# Patient Record
Sex: MALE | Race: WHITE | NOT HISPANIC OR LATINO | Employment: OTHER | URBAN - METROPOLITAN AREA
[De-identification: names, ages, dates, MRNs, and addresses within clinical notes are randomized per-mention and may not be internally consistent; named-entity substitution may affect disease eponyms.]

---

## 2017-01-06 ENCOUNTER — HOSPITAL ENCOUNTER (OUTPATIENT)
Dept: GENERAL RADIOLOGY | Facility: HOSPITAL | Age: 67
Discharge: HOME | End: 2017-01-06
Attending: FAMILY MEDICINE | Admitting: FAMILY MEDICINE

## 2021-04-07 ENCOUNTER — OFFICE (AMBULATORY)
Dept: URBAN - METROPOLITAN AREA PATHOLOGY 4 | Facility: PATHOLOGY | Age: 71
End: 2021-04-07
Payer: COMMERCIAL

## 2021-04-07 ENCOUNTER — ON CAMPUS - OUTPATIENT (AMBULATORY)
Dept: URBAN - METROPOLITAN AREA HOSPITAL 2 | Facility: HOSPITAL | Age: 71
End: 2021-04-07
Payer: COMMERCIAL

## 2021-04-07 VITALS
SYSTOLIC BLOOD PRESSURE: 114 MMHG | DIASTOLIC BLOOD PRESSURE: 35 MMHG | TEMPERATURE: 97.5 F | HEIGHT: 67 IN | HEART RATE: 72 BPM | OXYGEN SATURATION: 98 % | DIASTOLIC BLOOD PRESSURE: 64 MMHG | HEART RATE: 69 BPM | SYSTOLIC BLOOD PRESSURE: 157 MMHG | HEART RATE: 67 BPM | SYSTOLIC BLOOD PRESSURE: 149 MMHG | HEART RATE: 94 BPM | RESPIRATION RATE: 20 BRPM | SYSTOLIC BLOOD PRESSURE: 141 MMHG | DIASTOLIC BLOOD PRESSURE: 45 MMHG | HEART RATE: 70 BPM | RESPIRATION RATE: 16 BRPM | DIASTOLIC BLOOD PRESSURE: 72 MMHG | RESPIRATION RATE: 18 BRPM | DIASTOLIC BLOOD PRESSURE: 69 MMHG | OXYGEN SATURATION: 96 % | OXYGEN SATURATION: 95 % | DIASTOLIC BLOOD PRESSURE: 50 MMHG | WEIGHT: 227 LBS | SYSTOLIC BLOOD PRESSURE: 116 MMHG | SYSTOLIC BLOOD PRESSURE: 127 MMHG | SYSTOLIC BLOOD PRESSURE: 137 MMHG | OXYGEN SATURATION: 94 % | SYSTOLIC BLOOD PRESSURE: 103 MMHG | OXYGEN SATURATION: 99 % | DIASTOLIC BLOOD PRESSURE: 55 MMHG | OXYGEN SATURATION: 97 %

## 2021-04-07 DIAGNOSIS — D12.2 BENIGN NEOPLASM OF ASCENDING COLON: ICD-10-CM

## 2021-04-07 DIAGNOSIS — R19.5 OTHER FECAL ABNORMALITIES: ICD-10-CM

## 2021-04-07 DIAGNOSIS — K57.30 DIVERTICULOSIS OF LARGE INTESTINE WITHOUT PERFORATION OR ABS: ICD-10-CM

## 2021-04-07 DIAGNOSIS — D3A.8 OTHER BENIGN NEUROENDOCRINE TUMORS: ICD-10-CM

## 2021-04-07 DIAGNOSIS — K64.8 OTHER HEMORRHOIDS: ICD-10-CM

## 2021-04-07 PROBLEM — K63.5 POLYP OF COLON: Status: ACTIVE | Noted: 2021-04-07

## 2021-04-07 PROBLEM — K62.1 RECTAL POLYP: Status: ACTIVE | Noted: 2021-04-07

## 2021-04-07 LAB
GI HISTOLOGY: A. UNSPECIFIED: (no result)
GI HISTOLOGY: B. UNSPECIFIED: (no result)
GI HISTOLOGY: PDF REPORT: (no result)

## 2021-04-07 PROCEDURE — 45385 COLONOSCOPY W/LESION REMOVAL: CPT | Performed by: INTERNAL MEDICINE

## 2021-04-07 PROCEDURE — 88341 IMHCHEM/IMCYTCHM EA ADD ANTB: CPT | Mod: 26 | Performed by: INTERNAL MEDICINE

## 2021-04-07 PROCEDURE — 88342 IMHCHEM/IMCYTCHM 1ST ANTB: CPT | Mod: 26 | Performed by: INTERNAL MEDICINE

## 2021-04-07 PROCEDURE — 88305 TISSUE EXAM BY PATHOLOGIST: CPT | Mod: 26 | Performed by: INTERNAL MEDICINE

## 2022-07-08 ENCOUNTER — OFFICE VISIT (OUTPATIENT)
Dept: CARDIOLOGY | Facility: CLINIC | Age: 72
End: 2022-07-08

## 2022-07-08 ENCOUNTER — PATIENT ROUNDING (BHMG ONLY) (OUTPATIENT)
Dept: CARDIOLOGY | Facility: CLINIC | Age: 72
End: 2022-07-08

## 2022-07-08 VITALS
DIASTOLIC BLOOD PRESSURE: 57 MMHG | HEART RATE: 54 BPM | OXYGEN SATURATION: 99 % | HEIGHT: 66 IN | WEIGHT: 237 LBS | SYSTOLIC BLOOD PRESSURE: 127 MMHG | BODY MASS INDEX: 38.09 KG/M2

## 2022-07-08 DIAGNOSIS — R06.09 DYSPNEA ON EXERTION: ICD-10-CM

## 2022-07-08 DIAGNOSIS — I20.9 NEW-ONSET ANGINA: Primary | ICD-10-CM

## 2022-07-08 DIAGNOSIS — E66.9 OBESITY (BMI 30-39.9): ICD-10-CM

## 2022-07-08 DIAGNOSIS — I10 ESSENTIAL HYPERTENSION: ICD-10-CM

## 2022-07-08 DIAGNOSIS — R00.1 BRADYCARDIA: ICD-10-CM

## 2022-07-08 DIAGNOSIS — E78.5 DYSLIPIDEMIA: ICD-10-CM

## 2022-07-08 DIAGNOSIS — R42 DIZZINESS: ICD-10-CM

## 2022-07-08 PROCEDURE — 99204 OFFICE O/P NEW MOD 45 MIN: CPT | Performed by: INTERNAL MEDICINE

## 2022-07-08 PROCEDURE — 93000 ELECTROCARDIOGRAM COMPLETE: CPT | Performed by: INTERNAL MEDICINE

## 2022-07-08 RX ORDER — ASPIRIN 81 MG/1
81 TABLET ORAL DAILY
Qty: 90 TABLET | Refills: 3 | Status: SHIPPED | OUTPATIENT
Start: 2022-07-08

## 2022-07-08 RX ORDER — BACLOFEN 20 MG/1
TABLET ORAL
COMMUNITY
Start: 2022-05-25

## 2022-07-08 RX ORDER — DULOXETIN HYDROCHLORIDE 60 MG/1
CAPSULE, DELAYED RELEASE ORAL
COMMUNITY
Start: 2022-06-21

## 2022-07-08 RX ORDER — AMMONIUM LACTATE 12 G/100G
LOTION TOPICAL
COMMUNITY
Start: 2022-04-11

## 2022-07-08 RX ORDER — PRAVASTATIN SODIUM 40 MG
TABLET ORAL
COMMUNITY

## 2022-07-08 RX ORDER — ALLOPURINOL 100 MG/1
100 TABLET ORAL
COMMUNITY

## 2022-07-08 RX ORDER — MELATONIN
1000 DAILY
COMMUNITY

## 2022-07-08 RX ORDER — GABAPENTIN 800 MG/1
TABLET ORAL
COMMUNITY
Start: 2022-07-05

## 2022-07-08 RX ORDER — HYDROCODONE BITARTRATE AND ACETAMINOPHEN 10; 325 MG/1; MG/1
TABLET ORAL
COMMUNITY
Start: 2022-07-07

## 2022-07-08 RX ORDER — ALBUTEROL SULFATE 90 UG/1
AEROSOL, METERED RESPIRATORY (INHALATION)
COMMUNITY
Start: 2022-05-03

## 2022-07-08 RX ORDER — MELOXICAM 15 MG/1
TABLET ORAL
COMMUNITY
Start: 2022-04-30

## 2022-07-08 RX ORDER — OMEPRAZOLE 20 MG/1
CAPSULE, DELAYED RELEASE ORAL
COMMUNITY
Start: 2022-05-03

## 2022-07-08 RX ORDER — LOSARTAN POTASSIUM 50 MG/1
50 TABLET ORAL DAILY
COMMUNITY

## 2022-07-08 RX ORDER — NITROGLYCERIN 0.4 MG/1
TABLET SUBLINGUAL
COMMUNITY
Start: 2022-05-03

## 2022-07-08 RX ORDER — CITALOPRAM 40 MG/1
TABLET ORAL
COMMUNITY
Start: 2022-05-06

## 2022-07-08 RX ORDER — ISOSORBIDE MONONITRATE 30 MG/1
30 TABLET, EXTENDED RELEASE ORAL EVERY MORNING
Qty: 30 TABLET | Refills: 11 | Status: SHIPPED | OUTPATIENT
Start: 2022-07-08

## 2022-07-08 RX ORDER — MECLIZINE HYDROCHLORIDE 25 MG/1
TABLET ORAL
COMMUNITY
Start: 2022-07-05

## 2022-07-08 RX ORDER — TAMSULOSIN HYDROCHLORIDE 0.4 MG/1
CAPSULE ORAL
COMMUNITY
Start: 2022-06-21

## 2022-07-08 NOTE — PROGRESS NOTES
Cardiology Consult Note    Patient Identification:  Name: Poncho Dalton  Age: 71 y.o.  Sex: male  :  1950  MRN: [id number]             Requesting Physician :  Ronaldo Alvarez MD      Reason for Consultation / Chief Complaint : Chest pain, bradycardia    History of Present Illness:      Mr. Poncho Mckenzie has PMH of    Hypertension  Hyperlipidemia  ELVIS/CPAP  BPH  Gout  Carcinoid tumor  THR, hernia surgery  Family history of CAD in brother  Smoker      Here for evaluation of chest pain.  Patient has been having recurrent substernal chest pain relieved with nitroglycerin in last 6 months.  Has been having palpitations shortness of breath and dizziness.    Patient's arterial blood pressure is 127/57, heart rate 54, O2 sat of 99% on room air.      Assessment:  :    Chest pain relieved with nitroglycerin consistent with new onset angina  Palpitations  Shortness of breath/dyspnea on exertion  Bradycardia  Dizziness  Hypertension  Dyslipidemia  ELVIS/CPAP, COPD  Gout, BPH  Obesity with BMI over 30  Smoker      Recommendations / Plan:        Reviewed EKG results with patient.  Continue medical management with losartan, pravastatin.  Add aspirin, isosorbide mononitrate  We will hold off on beta-blockers due to bradycardia.  Patient is on no medications which can make him bradycardic, will monitor and consider further evaluation treatment about bradycardia and follow-up.  We will check an echo to evaluate patient's dyspnea  Patient has stasis changes and has dyspnea we will check BNP level.  We will check stress Cardiolite to evaluate new onset angina  Counseled on smoking cessation  Reviewed BMI over 30 counseled on weight loss diet and exercise  We will follow-up and consider further evaluation treatment.             Diagnosis Plan   1. New-onset angina (HCC)  Adult Transthoracic Echo Complete W/ Cont if Necessary Per Protocol    Stress Test With Myocardial Perfusion One Day    BNP    Basic Metabolic  Panel   2. Dyspnea on exertion  Adult Transthoracic Echo Complete W/ Cont if Necessary Per Protocol    Stress Test With Myocardial Perfusion One Day    BNP    Basic Metabolic Panel   3. Dizziness  Adult Transthoracic Echo Complete W/ Cont if Necessary Per Protocol    Stress Test With Myocardial Perfusion One Day    BNP    Basic Metabolic Panel   4. Bradycardia  Adult Transthoracic Echo Complete W/ Cont if Necessary Per Protocol    Stress Test With Myocardial Perfusion One Day    BNP    Basic Metabolic Panel   5. Essential hypertension  Adult Transthoracic Echo Complete W/ Cont if Necessary Per Protocol    Stress Test With Myocardial Perfusion One Day    BNP    Basic Metabolic Panel   6. Dyslipidemia  Adult Transthoracic Echo Complete W/ Cont if Necessary Per Protocol    Stress Test With Myocardial Perfusion One Day    BNP    Basic Metabolic Panel   7. Obesity (BMI 30-39.9)  Adult Transthoracic Echo Complete W/ Cont if Necessary Per Protocol    Stress Test With Myocardial Perfusion One Day    BNP    Basic Metabolic Panel              Past Medical History:  History reviewed. No pertinent past medical history.  Past Surgical History:  Past Surgical History:   Procedure Laterality Date   • CARDIAC CATHETERIZATION        Allergies:  No Known Allergies  Home Meds:  Current Meds:     Current Outpatient Medications:   •  albuterol sulfate  (90 Base) MCG/ACT inhaler, , Disp: , Rfl:   •  allopurinol (ZYLOPRIM) 100 MG tablet, Take 100 mg by mouth., Disp: , Rfl:   •  ammonium lactate (LAC-HYDRIN) 12 % lotion, , Disp: , Rfl:   •  baclofen (LIORESAL) 20 MG tablet, , Disp: , Rfl:   •  Calcium Carbonate-Vitamin D (Oyster Shell Calcium/D) 250-125 MG-UNIT tablet, Take 1 tablet by mouth Daily., Disp: , Rfl:   •  cholecalciferol (VITAMIN D3) 25 MCG (1000 UT) tablet, Take 1,000 Units by mouth Daily., Disp: , Rfl:   •  citalopram (CeleXA) 40 MG tablet, , Disp: , Rfl:   •  DULoxetine (CYMBALTA) 60 MG capsule, , Disp: , Rfl:   •   "gabapentin (NEURONTIN) 800 MG tablet, , Disp: , Rfl:   •  HYDROcodone-acetaminophen (NORCO)  MG per tablet, , Disp: , Rfl:   •  losartan (COZAAR) 50 MG tablet, Take 50 mg by mouth Daily., Disp: , Rfl:   •  meclizine (ANTIVERT) 25 MG tablet, , Disp: , Rfl:   •  meloxicam (MOBIC) 15 MG tablet, , Disp: , Rfl:   •  nitroglycerin (NITROSTAT) 0.4 MG SL tablet, , Disp: , Rfl:   •  omeprazole (priLOSEC) 20 MG capsule, , Disp: , Rfl:   •  pravastatin (PRAVACHOL) 40 MG tablet, Take  by mouth., Disp: , Rfl:   •  tamsulosin (FLOMAX) 0.4 MG capsule 24 hr capsule, , Disp: , Rfl:   •  aspirin (aspirin) 81 MG EC tablet, Take 1 tablet by mouth Daily., Disp: 90 tablet, Rfl: 3  •  isosorbide mononitrate (IMDUR) 30 MG 24 hr tablet, Take 1 tablet by mouth Every Morning., Disp: 30 tablet, Rfl: 11  Social History:   Social History     Tobacco Use   • Smoking status: Current Every Day Smoker     Types: Cigarettes   • Smokeless tobacco: Never Used   Substance Use Topics   • Alcohol use: Not on file      Family History:  No family history on file.     Review of Systems : Review of Systems   Constitutional: Negative for fever and malaise/fatigue.   Cardiovascular: Positive for chest pain and palpitations. Negative for dyspnea on exertion.   Respiratory: Positive for shortness of breath. Negative for cough.    Skin: Negative for rash.   Gastrointestinal: Negative for abdominal pain, nausea and vomiting.   Neurological: Positive for dizziness and numbness. Negative for focal weakness and headaches.   All other systems reviewed and are negative.            Constitutional:       Physical Exam   /57 (BP Location: Left arm, Patient Position: Sitting, Cuff Size: Large Adult)   Pulse 54   Ht 167.6 cm (66\")   Wt 108 kg (237 lb)   SpO2 99%   BMI 38.25 kg/m²   Physical Exam  General:  Appears in no acute distress  Eyes: Sclera is anicteric,  conjunctiva is clear   HEENT:  No JVD. Thyroid not visibly enlarged. No mucosal pallor or " cyanosis  Respiratory: Respirations regular and unlabored at rest.  Bilaterally good breath sounds, with good air entry in all fields. No crackles, rubs or wheezes auscultated  Cardiovascular: S1,S2 Regular rate and rhythm. No murmur, rub or gallop auscultated.  Gastrointestinal: Abdomen soft, flat, non tender. Bowel sounds present.   Musculoskeletal:  No abnormal movements  Extremities: No digital clubbing or cyanosis  Skin: Stasis dermatitis in  Neuro: Alert and awake, walks with a walker    Cardiographics  ECG: EKG tracing was  personally reviewed/interpreted by me    ECG 12 Lead    Date/Time: 7/8/2022 8:19 PM  Performed by: Adan Lutz MD  Authorized by: Adan Lutz MD   Comparison: not compared with previous ECG   Previous ECG: no previous ECG available  Comments: EKG done today reviewed/interpreted by me reveals sinus bradycardia at the rate of 54 bpm            Echocardiogram:       Imaging  Chest X-ray:   Imaging Results (Last 24 Hours)     ** No results found for the last 24 hours. **          Lab Review: I have reviewed the labs                                      Adan Lutz MD  7/13/2022, 20:14 EDT      EMR Dragon/Transcription:   Dictated utilizing Dragon dictation

## 2022-07-08 NOTE — PROGRESS NOTES
A My-Chart message has been sent to the patient for PATIENT ROUNDING with [facility]

## 2022-07-29 ENCOUNTER — OFFICE VISIT (OUTPATIENT)
Dept: CARDIOLOGY | Facility: CLINIC | Age: 72
End: 2022-07-29

## 2022-07-29 ENCOUNTER — HOSPITAL ENCOUNTER (OUTPATIENT)
Dept: CARDIOLOGY | Facility: HOSPITAL | Age: 72
Discharge: HOME | End: 2022-07-29

## 2022-07-29 VITALS
SYSTOLIC BLOOD PRESSURE: 124 MMHG | WEIGHT: 237 LBS | HEIGHT: 66 IN | DIASTOLIC BLOOD PRESSURE: 80 MMHG | BODY MASS INDEX: 38.09 KG/M2 | HEART RATE: 61 BPM

## 2022-07-29 VITALS
WEIGHT: 237 LBS | SYSTOLIC BLOOD PRESSURE: 133 MMHG | HEIGHT: 66 IN | DIASTOLIC BLOOD PRESSURE: 87 MMHG | BODY MASS INDEX: 38.09 KG/M2

## 2022-07-29 DIAGNOSIS — E66.9 OBESITY (BMI 30-39.9): ICD-10-CM

## 2022-07-29 DIAGNOSIS — I10 ESSENTIAL HYPERTENSION: ICD-10-CM

## 2022-07-29 DIAGNOSIS — I20.9 NEW-ONSET ANGINA: ICD-10-CM

## 2022-07-29 DIAGNOSIS — R00.1 BRADYCARDIA: ICD-10-CM

## 2022-07-29 DIAGNOSIS — R00.1 BRADYCARDIA: Primary | ICD-10-CM

## 2022-07-29 DIAGNOSIS — E78.5 DYSLIPIDEMIA: ICD-10-CM

## 2022-07-29 DIAGNOSIS — R42 DIZZINESS: ICD-10-CM

## 2022-07-29 DIAGNOSIS — R06.09 DYSPNEA ON EXERTION: ICD-10-CM

## 2022-07-29 LAB
BH CV ECHO MEAS - AO MAX PG: 6.4 MMHG
BH CV ECHO MEAS - AO MEAN PG: 3 MMHG
BH CV ECHO MEAS - AO ROOT DIAM: 3.3 CM
BH CV ECHO MEAS - AO V2 MAX: 126.4 CM/SEC
BH CV ECHO MEAS - AO V2 VTI: 25.6 CM
BH CV ECHO MEAS - AVA(I,D): 2.8 CM2
BH CV ECHO MEAS - EDV(CUBED): 164.4 ML
BH CV ECHO MEAS - EDV(MOD-SP4): 73.9 ML
BH CV ECHO MEAS - EF(MOD-SP4): 56.6 %
BH CV ECHO MEAS - ESV(CUBED): 27 ML
BH CV ECHO MEAS - ESV(MOD-SP4): 32 ML
BH CV ECHO MEAS - FS: 45.2 %
BH CV ECHO MEAS - IVS/LVPW: 0.89 CM
BH CV ECHO MEAS - IVSD: 1.04 CM
BH CV ECHO MEAS - LA DIMENSION: 4.4 CM
BH CV ECHO MEAS - LV MASS(C)D: 242.4 GRAMS
BH CV ECHO MEAS - LV MAX PG: 2.6 MMHG
BH CV ECHO MEAS - LV MEAN PG: 1.21 MMHG
BH CV ECHO MEAS - LV V1 MAX: 81.4 CM/SEC
BH CV ECHO MEAS - LV V1 VTI: 19 CM
BH CV ECHO MEAS - LVIDD: 5.5 CM
BH CV ECHO MEAS - LVIDS: 3 CM
BH CV ECHO MEAS - LVOT AREA: 3.8 CM2
BH CV ECHO MEAS - LVOT DIAM: 2.21 CM
BH CV ECHO MEAS - LVPWD: 1.17 CM
BH CV ECHO MEAS - MV A MAX VEL: 90.7 CM/SEC
BH CV ECHO MEAS - MV DEC SLOPE: 175.4 CM/SEC2
BH CV ECHO MEAS - MV DEC TIME: 0.31 MSEC
BH CV ECHO MEAS - MV E MAX VEL: 54.5 CM/SEC
BH CV ECHO MEAS - MV E/A: 0.6
BH CV ECHO MEAS - MV MAX PG: 4.1 MMHG
BH CV ECHO MEAS - MV MEAN PG: 1.37 MMHG
BH CV ECHO MEAS - MV V2 VTI: 27.9 CM
BH CV ECHO MEAS - MVA(VTI): 2.6 CM2
BH CV ECHO MEAS - PA ACC TIME: 0.1 SEC
BH CV ECHO MEAS - PA PR(ACCEL): 33.8 MMHG
BH CV ECHO MEAS - PA V2 MAX: 112 CM/SEC
BH CV ECHO MEAS - RV MAX PG: 2.9 MMHG
BH CV ECHO MEAS - RV V1 MAX: 85.6 CM/SEC
BH CV ECHO MEAS - RV V1 VTI: 11.4 CM
BH CV ECHO MEAS - RVDD: 3.2 CM
BH CV ECHO MEAS - SV(LVOT): 72.7 ML
BH CV ECHO MEAS - SV(MOD-SP4): 41.9 ML
BH CV REST NUCLEAR ISOTOPE DOSE: 11 MCI
BH CV STRESS BP STAGE 1: NORMAL
BH CV STRESS COMMENTS STAGE 1: NORMAL
BH CV STRESS DOSE REGADENOSON STAGE 1: 0.4
BH CV STRESS DURATION MIN STAGE 1: 0
BH CV STRESS DURATION SEC STAGE 1: 10
BH CV STRESS HR STAGE 1: 60
BH CV STRESS NUCLEAR ISOTOPE DOSE: 31.2 MCI
BH CV STRESS PROTOCOL 1: NORMAL
BH CV STRESS RECOVERY BP: NORMAL MMHG
BH CV STRESS RECOVERY HR: 77 BPM
BH CV STRESS STAGE 1: 1
MAXIMAL PREDICTED HEART RATE: 150 BPM
MAXIMAL PREDICTED HEART RATE: 150 BPM
STRESS BASELINE BP: NORMAL MMHG
STRESS BASELINE HR: 60 BPM
STRESS TARGET HR: 128 BPM
STRESS TARGET HR: 128 BPM

## 2022-07-29 PROCEDURE — 93306 TTE W/DOPPLER COMPLETE: CPT | Performed by: INTERNAL MEDICINE

## 2022-07-29 PROCEDURE — 93018 CV STRESS TEST I&R ONLY: CPT | Performed by: INTERNAL MEDICINE

## 2022-07-29 PROCEDURE — 93306 TTE W/DOPPLER COMPLETE: CPT

## 2022-07-29 PROCEDURE — 78452 HT MUSCLE IMAGE SPECT MULT: CPT | Performed by: INTERNAL MEDICINE

## 2022-07-29 PROCEDURE — 93016 CV STRESS TEST SUPVJ ONLY: CPT | Performed by: NURSE PRACTITIONER

## 2022-07-29 PROCEDURE — 78452 HT MUSCLE IMAGE SPECT MULT: CPT

## 2022-07-29 PROCEDURE — 93017 CV STRESS TEST TRACING ONLY: CPT

## 2022-07-29 PROCEDURE — A9500 TC99M SESTAMIBI: HCPCS | Performed by: INTERNAL MEDICINE

## 2022-07-29 PROCEDURE — 25010000002 REGADENOSON 0.4 MG/5ML SOLUTION: Performed by: INTERNAL MEDICINE

## 2022-07-29 PROCEDURE — 99214 OFFICE O/P EST MOD 30 MIN: CPT | Performed by: INTERNAL MEDICINE

## 2022-07-29 PROCEDURE — 0 TECHNETIUM SESTAMIBI: Performed by: INTERNAL MEDICINE

## 2022-07-29 RX ADMIN — TECHNETIUM TC 99M SESTAMIBI 1 DOSE: 1 INJECTION INTRAVENOUS at 08:23

## 2022-07-29 RX ADMIN — REGADENOSON 0.4 MG: 0.08 INJECTION, SOLUTION INTRAVENOUS at 10:52

## 2022-07-29 RX ADMIN — TECHNETIUM TC 99M SESTAMIBI 1 DOSE: 1 INJECTION INTRAVENOUS at 10:51

## 2022-07-29 NOTE — PROGRESS NOTES
Subjective:     Encounter Date:07/29/2022      Patient ID: Poncho Dalton is a 71 y.o. male.    Chief Complaint : Stress test follow-up.    History of Present Illness        Mr. Poncho Mckenzie has PMH of    Hypertension  Hyperlipidemia  ELVIS/CPAP  BPH  Gout  Carcinoid tumor  THR, hernia surgery  Family history of CAD in brother  Smoker      Here for stress test follow-up.  Patient was seen for evaluation of chest pain.  Patient has been having recurrent substernal chest pain relieved with nitroglycerin in last 6 months.  Has been having palpitations shortness of breath and dizziness.    Patient's arterial blood pressure is 124/80, heart rate 61 bpm.      Assessment:  :    Chest pain relieved with nitroglycerin consistent with new onset angina  Palpitations  Shortness of breath/dyspnea on exertion  Bradycardia  Dizziness  Hypertension  Dyslipidemia  ELVIS/CPAP, COPD  Gout, BPH  Obesity with BMI over 30  Smoker      Recommendations / Plan:        Reviewed echo and stress test results with patient.  Continue medical management with losartan, pravastatin.  Add aspirin, isosorbide mononitrate  We will hold off on beta-blockers due to bradycardia.  Patient is on no medications which can make him bradycardic, will monitor and consider further evaluation treatment about bradycardia and follow-up.  Counseled on smoking cessation  Reviewed BMI over 30, counseled on weight loss diet and exercise  We will follow-up and consider further evaluation treatment.  Patient has stasis changes bilateral lower extremity we will check BNP which is not done.  Will check BNP level.        Procedures  Echocardiogram 720 8/9/2022 reviewed/interpreted by me reveals normal LV systolic function  Lexiscan Cardiolite 7/29/2022 reviewed/interpreted by me reveals normal perfusion EF of 67%    The following portions of the patient's history were reviewed and updated as appropriate: allergies, current medications, past family history, past medical  history, past social history, past surgical history and problem list.    Assessment:         [facility]     Diagnosis Plan   1. Bradycardia     2. Dyslipidemia     3. Essential hypertension     4. Obesity (BMI 30-39.9)            Plan:               Past Medical History:  History reviewed. No pertinent past medical history.  Past Surgical History:  Past Surgical History:   Procedure Laterality Date   • CARDIAC CATHETERIZATION        Allergies:  No Known Allergies  Home Meds:  Current Meds:     Current Outpatient Medications:   •  albuterol sulfate  (90 Base) MCG/ACT inhaler, , Disp: , Rfl:   •  allopurinol (ZYLOPRIM) 100 MG tablet, Take 100 mg by mouth., Disp: , Rfl:   •  ammonium lactate (LAC-HYDRIN) 12 % lotion, , Disp: , Rfl:   •  aspirin (aspirin) 81 MG EC tablet, Take 1 tablet by mouth Daily., Disp: 90 tablet, Rfl: 3  •  baclofen (LIORESAL) 20 MG tablet, , Disp: , Rfl:   •  Calcium Carbonate-Vitamin D (Oyster Shell Calcium/D) 250-125 MG-UNIT tablet, Take 1 tablet by mouth Daily., Disp: , Rfl:   •  cholecalciferol (VITAMIN D3) 25 MCG (1000 UT) tablet, Take 1,000 Units by mouth Daily., Disp: , Rfl:   •  citalopram (CeleXA) 40 MG tablet, , Disp: , Rfl:   •  DULoxetine (CYMBALTA) 60 MG capsule, , Disp: , Rfl:   •  gabapentin (NEURONTIN) 800 MG tablet, , Disp: , Rfl:   •  HYDROcodone-acetaminophen (NORCO)  MG per tablet, , Disp: , Rfl:   •  isosorbide mononitrate (IMDUR) 30 MG 24 hr tablet, Take 1 tablet by mouth Every Morning., Disp: 30 tablet, Rfl: 11  •  losartan (COZAAR) 50 MG tablet, Take 50 mg by mouth Daily., Disp: , Rfl:   •  meclizine (ANTIVERT) 25 MG tablet, , Disp: , Rfl:   •  meloxicam (MOBIC) 15 MG tablet, , Disp: , Rfl:   •  nitroglycerin (NITROSTAT) 0.4 MG SL tablet, , Disp: , Rfl:   •  omeprazole (priLOSEC) 20 MG capsule, , Disp: , Rfl:   •  pravastatin (PRAVACHOL) 40 MG tablet, Take  by mouth., Disp: , Rfl:   •  tamsulosin (FLOMAX) 0.4 MG capsule 24 hr capsule, , Disp: , Rfl:   No current  "facility-administered medications for this visit.  Social History:   Social History     Tobacco Use   • Smoking status: Current Every Day Smoker     Types: Cigarettes   • Smokeless tobacco: Never Used   Substance Use Topics   • Alcohol use: Defer      Family History:  History reviewed. No pertinent family history.           Review of Systems   Constitutional: Negative for chills, fever, malaise/fatigue and weight gain.   HENT: Negative for nosebleeds.    Eyes: Negative for visual disturbance.   Cardiovascular: Negative for chest pain, dyspnea on exertion, leg swelling, near-syncope, palpitations and syncope.   Respiratory: Negative for shortness of breath.    Endocrine: Negative for cold intolerance.   Hematologic/Lymphatic: Negative for bleeding problem.   Skin: Negative for rash.   Musculoskeletal: Negative for back pain.   Gastrointestinal: Negative for nausea and vomiting.   Genitourinary: Negative for nocturia.   Neurological: Negative for dizziness, light-headedness, numbness and weakness.   Psychiatric/Behavioral: Negative for altered mental status.   Allergic/Immunologic: Negative for hives.   All other systems reviewed and are negative.    All other systems are negative         Objective:     Physical Exam  /80   Pulse 61   Ht 167.6 cm (66\")   Wt 108 kg (237 lb)   BMI 38.25 kg/m²   General:  Appears in no acute distress  Eyes: Sclera is anicteric,  conjunctiva is clear   HEENT:  No JVD.  No carotid bruits  Respiratory: Respirations regular and unlabored at rest.  Clear to auscultation  Cardiovascular: S1,S2 Regular rate and rhythm. No murmur, rub or gallop auscultated.   Extremities: No digital clubbing or cyanosis, no edema  Skin: Stasis changes  Neuro: Alert and awake.  Wheelchair-bound    Lab Reviewed:         Adan Lutz MD  7/29/2022 12:52 EDT      EMR Dragon/Transcription:   \"Dictated utilizing Dragon dictation\".        "

## 2023-05-24 ENCOUNTER — OFFICE VISIT (OUTPATIENT)
Dept: WOUND CARE | Facility: HOSPITAL | Age: 73
End: 2023-05-24
Payer: MEDICARE

## 2023-05-24 PROCEDURE — G0463 HOSPITAL OUTPT CLINIC VISIT: HCPCS

## 2023-05-26 ENCOUNTER — TRANSCRIBE ORDERS (OUTPATIENT)
Dept: ADMINISTRATIVE | Facility: HOSPITAL | Age: 73
End: 2023-05-26

## 2023-05-26 DIAGNOSIS — I70.213 ATHEROSCLEROSIS OF NATIVE ARTERY OF BOTH LOWER EXTREMITIES WITH INTERMITTENT CLAUDICATION: Primary | ICD-10-CM

## 2023-05-31 ENCOUNTER — OFFICE VISIT (OUTPATIENT)
Dept: WOUND CARE | Facility: HOSPITAL | Age: 73
End: 2023-05-31

## 2023-05-31 PROCEDURE — G0463 HOSPITAL OUTPT CLINIC VISIT: HCPCS

## 2023-06-01 ENCOUNTER — HOSPITAL ENCOUNTER (OUTPATIENT)
Dept: CARDIOLOGY | Facility: HOSPITAL | Age: 73
Discharge: HOME | End: 2023-06-01

## 2023-06-01 ENCOUNTER — TRANSCRIBE ORDERS (OUTPATIENT)
Dept: ADMINISTRATIVE | Facility: HOSPITAL | Age: 73
End: 2023-06-01
Payer: MEDICARE

## 2023-06-01 ENCOUNTER — HOSPITAL ENCOUNTER (OUTPATIENT)
Dept: GENERAL RADIOLOGY | Facility: HOSPITAL | Age: 73
Discharge: HOME | End: 2023-06-01

## 2023-06-01 DIAGNOSIS — M79.671 RIGHT FOOT PAIN: Primary | ICD-10-CM

## 2023-06-01 DIAGNOSIS — M79.671 RIGHT FOOT PAIN: ICD-10-CM

## 2023-06-01 DIAGNOSIS — I70.213 ATHEROSCLEROSIS OF NATIVE ARTERY OF BOTH LOWER EXTREMITIES WITH INTERMITTENT CLAUDICATION: ICD-10-CM

## 2023-06-01 LAB
BH CV LOWER ARTERIAL LEFT ABI RATIO: 0.81
BH CV LOWER ARTERIAL LEFT CALF RATIO: 0.92
BH CV LOWER ARTERIAL LEFT DORSALIS PEDIS SYS MAX: 84
BH CV LOWER ARTERIAL LEFT GREAT TOE SYS MAX: 125
BH CV LOWER ARTERIAL LEFT LOW THIGH RATIO: 1.03
BH CV LOWER ARTERIAL LEFT LOW THIGH SYS MAX: 107
BH CV LOWER ARTERIAL LEFT POPLITEAL SYS MAX: 96
BH CV LOWER ARTERIAL LEFT TBI RATIO: 1.2
BH CV LOWER ARTERIAL RIGHT ABI RATIO: 0.85
BH CV LOWER ARTERIAL RIGHT CALF RATIO: 0.83
BH CV LOWER ARTERIAL RIGHT DORSALIS PEDIS SYS MAX: 80
BH CV LOWER ARTERIAL RIGHT GREAT TOE SYS MAX: 119
BH CV LOWER ARTERIAL RIGHT LOW THIGH RATIO: 1.01
BH CV LOWER ARTERIAL RIGHT LOW THIGH SYS MAX: 105
BH CV LOWER ARTERIAL RIGHT POPLITEAL SYS MAX: 86
BH CV LOWER ARTERIAL RIGHT POST TIBIAL SYS MAX: 88
BH CV LOWER ARTERIAL RIGHT TBI RATIO: 1.14
MAXIMAL PREDICTED HEART RATE: 149 BPM
STRESS TARGET HR: 127 BPM
UPPER ARTERIAL LEFT ARM BRACHIAL SYS MAX: 93 MMHG
UPPER ARTERIAL RIGHT ARM BRACHIAL SYS MAX: 104 MMHG

## 2023-06-01 PROCEDURE — 73630 X-RAY EXAM OF FOOT: CPT

## 2023-06-01 PROCEDURE — 93923 UPR/LXTR ART STDY 3+ LVLS: CPT

## 2023-06-15 ENCOUNTER — APPOINTMENT (OUTPATIENT)
Dept: VASCULAR SURGERY | Facility: HOSPITAL | Age: 73
End: 2023-06-15
Payer: MEDICARE

## 2023-06-15 ENCOUNTER — TRANSCRIBE ORDERS (OUTPATIENT)
Dept: ADMINISTRATIVE | Facility: HOSPITAL | Age: 73
End: 2023-06-15
Payer: MEDICARE

## 2023-06-15 DIAGNOSIS — I73.9 PAD (PERIPHERAL ARTERY DISEASE): Primary | ICD-10-CM

## 2023-06-15 PROCEDURE — G0463 HOSPITAL OUTPT CLINIC VISIT: HCPCS

## 2023-08-02 ENCOUNTER — OFFICE VISIT (OUTPATIENT)
Dept: WOUND CARE | Facility: HOSPITAL | Age: 73
End: 2023-08-02
Payer: MEDICARE

## 2023-08-02 PROCEDURE — G0463 HOSPITAL OUTPT CLINIC VISIT: HCPCS

## 2023-08-08 RX ORDER — ISOSORBIDE MONONITRATE 30 MG/1
TABLET, EXTENDED RELEASE ORAL
Qty: 30 TABLET | Refills: 0 | Status: SHIPPED | OUTPATIENT
Start: 2023-08-08

## 2023-08-08 NOTE — TELEPHONE ENCOUNTER
Rx Refill Note  Requested Prescriptions     Pending Prescriptions Disp Refills    isosorbide mononitrate (IMDUR) 30 MG 24 hr tablet [Pharmacy Med Name: ISOSORBIDE MONONIT ER 30 MG TB] 30 tablet 0     Sig: TAKE ONE TABLET BY MOUTH EVERY MORNING      Last office visit with prescribing clinician: 7/29/2022   Last telemedicine visit with prescribing clinician: Visit date not found   Next office visit with prescribing clinician: Visit date not found                         Would you like a call back once the refill request has been completed: [] Yes [] No    If the office needs to give you a call back, can they leave a voicemail: [] Yes [] No    Wendy Thomas MA  08/08/23, 08:27 EDT

## 2023-09-05 RX ORDER — ISOSORBIDE MONONITRATE 30 MG/1
TABLET, EXTENDED RELEASE ORAL
Qty: 30 TABLET | Refills: 0 | OUTPATIENT
Start: 2023-09-05

## 2023-09-06 ENCOUNTER — OFFICE VISIT (OUTPATIENT)
Dept: WOUND CARE | Facility: HOSPITAL | Age: 73
End: 2023-09-06
Payer: MEDICARE

## 2023-09-06 DIAGNOSIS — L97.519: ICD-10-CM

## 2023-09-06 DIAGNOSIS — F17.200 NICOTINE DEPENDENCE, UNCOMPLICATED, UNSPECIFIED NICOTINE PRODUCT TYPE: ICD-10-CM

## 2023-09-06 DIAGNOSIS — L85.3 XEROSIS CUTIS: Primary | ICD-10-CM

## 2023-09-06 PROCEDURE — 97602 WOUND(S) CARE NON-SELECTIVE: CPT

## 2023-10-17 ENCOUNTER — TRANSCRIBE ORDERS (OUTPATIENT)
Dept: ADMINISTRATIVE | Facility: HOSPITAL | Age: 73
End: 2023-10-17
Payer: MEDICARE

## 2023-10-17 DIAGNOSIS — I71.43 INFRARENAL ABDOMINAL AORTIC ANEURYSM (AAA) WITHOUT RUPTURE: Primary | ICD-10-CM

## 2023-10-19 ENCOUNTER — APPOINTMENT (OUTPATIENT)
Dept: CARDIOLOGY | Facility: HOSPITAL | Age: 73
End: 2023-10-19
Payer: MEDICARE

## 2023-10-19 ENCOUNTER — HOSPITAL ENCOUNTER (EMERGENCY)
Facility: HOSPITAL | Age: 73
Discharge: HOME | End: 2023-10-19
Attending: EMERGENCY MEDICINE
Payer: MEDICARE

## 2023-10-19 VITALS
DIASTOLIC BLOOD PRESSURE: 79 MMHG | RESPIRATION RATE: 18 BRPM | HEIGHT: 66 IN | TEMPERATURE: 98.6 F | OXYGEN SATURATION: 93 % | WEIGHT: 238 LBS | BODY MASS INDEX: 38.25 KG/M2 | HEART RATE: 80 BPM | SYSTOLIC BLOOD PRESSURE: 136 MMHG

## 2023-10-19 DIAGNOSIS — L03.116 CELLULITIS OF LEFT LEG: Primary | ICD-10-CM

## 2023-10-19 LAB
BH CV LOWER VASCULAR LEFT COMMON FEMORAL AUGMENT: NORMAL
BH CV LOWER VASCULAR LEFT COMMON FEMORAL COMPETENT: NORMAL
BH CV LOWER VASCULAR LEFT COMMON FEMORAL COMPRESS: NORMAL
BH CV LOWER VASCULAR LEFT COMMON FEMORAL PHASIC: NORMAL
BH CV LOWER VASCULAR LEFT COMMON FEMORAL SPONT: NORMAL
BH CV LOWER VASCULAR LEFT DISTAL FEMORAL COMPRESS: NORMAL
BH CV LOWER VASCULAR LEFT GASTRONEMIUS COMPRESS: NORMAL
BH CV LOWER VASCULAR LEFT GREATER SAPH AK COMPRESS: NORMAL
BH CV LOWER VASCULAR LEFT GREATER SAPH BK COMPRESS: NORMAL
BH CV LOWER VASCULAR LEFT LESSER SAPH COMPRESS: NORMAL
BH CV LOWER VASCULAR LEFT MID FEMORAL AUGMENT: NORMAL
BH CV LOWER VASCULAR LEFT MID FEMORAL COMPETENT: NORMAL
BH CV LOWER VASCULAR LEFT MID FEMORAL COMPRESS: NORMAL
BH CV LOWER VASCULAR LEFT MID FEMORAL PHASIC: NORMAL
BH CV LOWER VASCULAR LEFT MID FEMORAL SPONT: NORMAL
BH CV LOWER VASCULAR LEFT PERONEAL COMPRESS: NORMAL
BH CV LOWER VASCULAR LEFT POPLITEAL AUGMENT: NORMAL
BH CV LOWER VASCULAR LEFT POPLITEAL COMPETENT: NORMAL
BH CV LOWER VASCULAR LEFT POPLITEAL COMPRESS: NORMAL
BH CV LOWER VASCULAR LEFT POPLITEAL PHASIC: NORMAL
BH CV LOWER VASCULAR LEFT POPLITEAL SPONT: NORMAL
BH CV LOWER VASCULAR LEFT POSTERIOR TIBIAL COMPRESS: NORMAL
BH CV LOWER VASCULAR LEFT PROXIMAL FEMORAL COMPRESS: NORMAL
BH CV LOWER VASCULAR LEFT SAPHENOFEMORAL JUNCTION COMPRESS: NORMAL
BH CV LOWER VASCULAR RIGHT COMMON FEMORAL AUGMENT: NORMAL
BH CV LOWER VASCULAR RIGHT COMMON FEMORAL COMPETENT: NORMAL
BH CV LOWER VASCULAR RIGHT COMMON FEMORAL COMPRESS: NORMAL
BH CV LOWER VASCULAR RIGHT COMMON FEMORAL PHASIC: NORMAL
BH CV LOWER VASCULAR RIGHT COMMON FEMORAL SPONT: NORMAL
BH CV VAS PRELIMINARY FINDINGS SCRIPTING: 1

## 2023-10-19 PROCEDURE — 93971 EXTREMITY STUDY: CPT

## 2023-10-19 PROCEDURE — 99284 EMERGENCY DEPT VISIT MOD MDM: CPT

## 2023-10-19 NOTE — ED NOTES
Graciela nurse at [facility] in [place], [place] called with report. Wheelchair van transport set up by LAI Turk

## 2023-10-19 NOTE — ED PROVIDER NOTES
Subjective   History of Present Illness  Patient is a 72-year-old male complaining of mild swelling and pain to his left lower leg for the past several days.  Denies fever cough chest pain shortness of breath or other complaint      Review of Systems    No past medical history on file.    No Known Allergies    Past Surgical History:   Procedure Laterality Date    CARDIAC CATHETERIZATION         No family history on file.    Social History     Socioeconomic History    Marital status:    Tobacco Use    Smoking status: Every Day     Types: Cigarettes    Smokeless tobacco: Never   Vaping Use    Vaping Use: Never used   Substance and Sexual Activity    Alcohol use: Defer    Drug use: Defer    Sexual activity: Defer           Objective   Physical Exam  Lungs are clear.  Heart has regular rhythm without murmur.  Extremities exam shows minimal swelling to his left lower leg.  Patient has no gastroc tenderness.  Patient has 2+ pulses and is neurovascular intact  Procedures           ED Course      Results for orders placed or performed during the hospital encounter of 10/19/23   Duplex Venous Lower Extremity - LEFT   Result Value Ref Range    Right Common Femoral Spont Y     Right Common Femoral Competent Y     Right Common Femoral Phasic Y     Right Common Femoral Compress C     Right Common Femoral Augment Y     Left Common Femoral Spont Y     Left Common Femoral Competent Y     Left Common Femoral Phasic Y     Left Common Femoral Compress C     Left Common Femoral Augment Y     Left Saphenofemoral Junction Compress C     Left Proximal Femoral Compress C     Left Mid Femoral Spont Y     Left Mid Femoral Competent Y     Left Mid Femoral Phasic Y     Left Mid Femoral Compress C     Left Mid Femoral Augment Y     Left Distal Femoral Compress C     Left Popliteal Spont Y     Left Popliteal Competent Y     Left Popliteal Phasic Y     Left Popliteal Compress C     Left Popliteal Augment Y     Left Posterior Tibial  Compress C     Left Peroneal Compress C     Left Gastronemius Compress C     Left Greater Saph AK Compress C     Left Greater Saph BK Compress C     Left Lesser Saph Compress C     BH CV VAS PRELIMINARY FINDINGS SCRIPTING 1.0                                           Medical Decision Making  Patient is findings consistent with minimal cellulitis to his left lower leg.  There is no DVT or SVT.  Patient be discharged with a prescription for Keflex.  He will see his MD for recheck as needed.    Risk  Prescription drug management.        Final diagnoses:   Cellulitis of left leg       ED Disposition  ED Disposition       ED Disposition   Discharge    Condition   Stable    Comment   --               No follow-up provider specified.       Medication List      No changes were made to your prescriptions during this visit.            Nick Chadwick MD  10/19/23 0653

## 2024-05-08 ENCOUNTER — TRANSCRIBE ORDERS (OUTPATIENT)
Dept: HOME HEALTH SERVICES | Facility: HOME HEALTHCARE | Age: 74
End: 2024-05-08
Payer: MEDICARE

## 2024-05-08 ENCOUNTER — OFFICE VISIT (OUTPATIENT)
Dept: WOUND CARE | Facility: HOSPITAL | Age: 74
End: 2024-05-08
Payer: MEDICARE

## 2024-05-08 ENCOUNTER — HOME HEALTH ADMISSION (OUTPATIENT)
Dept: HOME HEALTH SERVICES | Facility: HOME HEALTHCARE | Age: 74
End: 2024-05-08
Payer: MEDICARE

## 2024-05-08 DIAGNOSIS — L97.222: Primary | ICD-10-CM

## 2024-05-08 PROCEDURE — G0463 HOSPITAL OUTPT CLINIC VISIT: HCPCS

## 2024-05-08 PROCEDURE — 97602 WOUND(S) CARE NON-SELECTIVE: CPT

## 2024-05-20 ENCOUNTER — OFFICE VISIT (OUTPATIENT)
Dept: WOUND CARE | Facility: HOSPITAL | Age: 74
End: 2024-05-20
Payer: MEDICARE

## 2024-05-20 DIAGNOSIS — I87.2 VENOUS INSUFFICIENCY (CHRONIC) (PERIPHERAL): Primary | ICD-10-CM

## 2024-05-20 DIAGNOSIS — I70.209 ATHEROSCLEROSIS OF NATIVE ARTERY OF LOWER EXTREMITY, UNSPECIFIED LATERALITY, WITH UNSPECIFIED PRESENCE OF CLINICAL MANIFESTATION: ICD-10-CM

## 2024-05-20 DIAGNOSIS — R26.9 UNSPECIFIED ABNORMALITIES OF GAIT AND MOBILITY: ICD-10-CM

## 2024-05-20 DIAGNOSIS — L97.822 NON-PRESSURE CHRONIC ULCER OF OTHER PART OF LEFT LOWER LEG WITH FAT LAYER EXPOSED: ICD-10-CM

## 2024-05-20 PROCEDURE — G0463 HOSPITAL OUTPT CLINIC VISIT: HCPCS

## 2024-06-03 ENCOUNTER — OFFICE VISIT (OUTPATIENT)
Dept: WOUND CARE | Facility: HOSPITAL | Age: 74
End: 2024-06-03
Payer: MEDICARE

## 2024-06-03 DIAGNOSIS — I70.209 ATHEROSCLEROSIS OF NATIVE ARTERY OF EXTREMITY, UNSPECIFIED EXTREMITY, WITH UNSPECIFIED PRESENCE OF CLINICAL MANIFESTATION: ICD-10-CM

## 2024-06-03 DIAGNOSIS — L97.822 NON-PRESSURE CHRONIC ULCER OF OTHER PART OF LEFT LOWER LEG WITH FAT LAYER EXPOSED: ICD-10-CM

## 2024-06-03 DIAGNOSIS — R26.9 UNSPECIFIED ABNORMALITIES OF GAIT AND MOBILITY: ICD-10-CM

## 2024-06-03 DIAGNOSIS — I87.2 VENOUS INSUFFICIENCY (CHRONIC) (PERIPHERAL): Primary | ICD-10-CM

## 2024-06-03 PROCEDURE — G0463 HOSPITAL OUTPT CLINIC VISIT: HCPCS

## 2024-12-23 ENCOUNTER — OFFICE VISIT (OUTPATIENT)
Dept: WOUND CARE | Facility: HOSPITAL | Age: 74
End: 2024-12-23
Payer: MEDICARE

## 2024-12-23 PROCEDURE — G0463 HOSPITAL OUTPT CLINIC VISIT: HCPCS

## 2024-12-23 PROCEDURE — 97602 WOUND(S) CARE NON-SELECTIVE: CPT

## 2025-01-13 ENCOUNTER — HOSPITAL ENCOUNTER (INPATIENT)
Facility: HOSPITAL | Age: 75
LOS: 2 days | Discharge: NURSING, REHAB OR LONG-TERM CARE | DRG: 190 | End: 2025-01-17
Attending: EMERGENCY MEDICINE | Admitting: FAMILY MEDICINE
Payer: MEDICARE

## 2025-01-13 ENCOUNTER — APPOINTMENT (OUTPATIENT)
Dept: GENERAL RADIOLOGY | Facility: HOSPITAL | Age: 75
DRG: 190 | End: 2025-01-13
Payer: MEDICARE

## 2025-01-13 DIAGNOSIS — J44.1 CHRONIC OBSTRUCTIVE PULMONARY DISEASE WITH ACUTE EXACERBATION: ICD-10-CM

## 2025-01-13 DIAGNOSIS — I73.9 PERIPHERAL ARTERIAL DISEASE: ICD-10-CM

## 2025-01-13 DIAGNOSIS — R06.03 ACUTE RESPIRATORY DISTRESS: Primary | ICD-10-CM

## 2025-01-13 DIAGNOSIS — J96.21 ACUTE ON CHRONIC RESPIRATORY FAILURE WITH HYPOXIA: ICD-10-CM

## 2025-01-13 PROBLEM — R53.83 FATIGUE: Status: ACTIVE | Noted: 2020-09-29

## 2025-01-13 PROBLEM — F32.A DEPRESSIVE DISORDER: Status: ACTIVE | Noted: 2020-09-29

## 2025-01-13 PROBLEM — F41.1 GENERALIZED ANXIETY DISORDER: Status: ACTIVE | Noted: 2018-08-10

## 2025-01-13 PROBLEM — J44.9 CHRONIC OBSTRUCTIVE PULMONARY DISEASE: Status: ACTIVE | Noted: 2023-05-02

## 2025-01-13 PROBLEM — I10 ESSENTIAL HYPERTENSION: Status: ACTIVE | Noted: 2020-09-29

## 2025-01-13 PROBLEM — N40.0 BENIGN PROSTATIC HYPERPLASIA: Status: ACTIVE | Noted: 2020-09-29

## 2025-01-13 PROBLEM — R26.89 IMPAIRMENT OF BALANCE: Status: ACTIVE | Noted: 2020-09-29

## 2025-01-13 PROBLEM — G47.33 OBSTRUCTIVE SLEEP APNEA SYNDROME: Status: ACTIVE | Noted: 2020-09-29

## 2025-01-13 PROBLEM — K21.9 GASTROESOPHAGEAL REFLUX DISEASE: Status: ACTIVE | Noted: 2020-09-29

## 2025-01-13 PROBLEM — E87.5 HYPERKALEMIA: Status: ACTIVE | Noted: 2025-01-13

## 2025-01-13 PROBLEM — M10.9 GOUT: Status: ACTIVE | Noted: 2020-09-29

## 2025-01-13 PROBLEM — E78.5 HYPERLIPIDEMIA: Status: ACTIVE | Noted: 2023-05-02

## 2025-01-13 LAB
ALBUMIN SERPL-MCNC: 4 G/DL (ref 3.5–5.2)
ALBUMIN/GLOB SERPL: 1 G/DL
ALP SERPL-CCNC: 110 U/L (ref 39–117)
ALT SERPL W P-5'-P-CCNC: 15 U/L (ref 1–41)
ANION GAP SERPL CALCULATED.3IONS-SCNC: 11.1 MMOL/L (ref 5–15)
ARTERIAL PATENCY WRIST A: POSITIVE
AST SERPL-CCNC: 32 U/L (ref 1–40)
ATMOSPHERIC PRESS: ABNORMAL MM[HG]
BASE EXCESS BLDA CALC-SCNC: 3 MMOL/L (ref 0–3)
BASOPHILS # BLD AUTO: 0.03 10*3/MM3 (ref 0–0.2)
BASOPHILS NFR BLD AUTO: 0.3 % (ref 0–1.5)
BDY SITE: ABNORMAL
BILIRUB SERPL-MCNC: 0.3 MG/DL (ref 0–1.2)
BUN SERPL-MCNC: 29 MG/DL (ref 8–23)
BUN/CREAT SERPL: 18.6 (ref 7–25)
CALCIUM SPEC-SCNC: 9.6 MG/DL (ref 8.6–10.5)
CHLORIDE SERPL-SCNC: 98 MMOL/L (ref 98–107)
CO2 BLDA-SCNC: 27.6 MMOL/L (ref 22–29)
CO2 SERPL-SCNC: 26.9 MMOL/L (ref 22–29)
CREAT SERPL-MCNC: 1.56 MG/DL (ref 0.76–1.27)
D-LACTATE SERPL-SCNC: 1.3 MMOL/L (ref 0.3–2)
DEPRECATED RDW RBC AUTO: 50.4 FL (ref 37–54)
EGFRCR SERPLBLD CKD-EPI 2021: 46.6 ML/MIN/1.73
EOSINOPHIL # BLD AUTO: 0.01 10*3/MM3 (ref 0–0.4)
EOSINOPHIL NFR BLD AUTO: 0.1 % (ref 0.3–6.2)
ERYTHROCYTE [DISTWIDTH] IN BLOOD BY AUTOMATED COUNT: 15.1 % (ref 12.3–15.4)
GEN 5 1HR TROPONIN T REFLEX: 27 NG/L
GLOBULIN UR ELPH-MCNC: 4 GM/DL
GLUCOSE SERPL-MCNC: 82 MG/DL (ref 65–99)
HCO3 BLDA-SCNC: 26.5 MMOL/L (ref 21–28)
HCT VFR BLD AUTO: 42.6 % (ref 37.5–51)
HEMODILUTION: NO
HGB BLD-MCNC: 13.9 G/DL (ref 13–17.7)
HOLD SPECIMEN: NORMAL
IMM GRANULOCYTES # BLD AUTO: 0.07 10*3/MM3 (ref 0–0.05)
IMM GRANULOCYTES NFR BLD AUTO: 0.7 % (ref 0–0.5)
INHALED O2 CONCENTRATION: 40 %
LYMPHOCYTES # BLD AUTO: 1.15 10*3/MM3 (ref 0.7–3.1)
LYMPHOCYTES NFR BLD AUTO: 11.6 % (ref 19.6–45.3)
MCH RBC QN AUTO: 30 PG (ref 26.6–33)
MCHC RBC AUTO-ENTMCNC: 32.6 G/DL (ref 31.5–35.7)
MCV RBC AUTO: 92 FL (ref 79–97)
MODALITY: ABNORMAL
MONOCYTES # BLD AUTO: 0.28 10*3/MM3 (ref 0.1–0.9)
MONOCYTES NFR BLD AUTO: 2.8 % (ref 5–12)
NEUTROPHILS NFR BLD AUTO: 8.4 10*3/MM3 (ref 1.7–7)
NEUTROPHILS NFR BLD AUTO: 84.5 % (ref 42.7–76)
NRBC BLD AUTO-RTO: 0 /100 WBC (ref 0–0.2)
NT-PROBNP SERPL-MCNC: 143 PG/ML (ref 0–900)
PCO2 BLDA: 36.1 MM HG (ref 35–48)
PH BLDA: 7.47 PH UNITS (ref 7.35–7.45)
PLATELET # BLD AUTO: 402 10*3/MM3 (ref 140–450)
PMV BLD AUTO: 8.6 FL (ref 6–12)
PO2 BLD: 241 MM[HG] (ref 0–500)
PO2 BLDA: 96.5 MM HG (ref 83–108)
POTASSIUM SERPL-SCNC: 5.5 MMOL/L (ref 3.5–5.2)
PROT SERPL-MCNC: 8 G/DL (ref 6–8.5)
RBC # BLD AUTO: 4.63 10*6/MM3 (ref 4.14–5.8)
SAO2 % BLDCOA: 98 % (ref 94–98)
SODIUM SERPL-SCNC: 136 MMOL/L (ref 136–145)
TROPONIN T % DELTA: -4 %
TROPONIN T NUMERIC DELTA: -1 NG/L
TROPONIN T SERPL HS-MCNC: 28 NG/L
WBC NRBC COR # BLD AUTO: 9.94 10*3/MM3 (ref 3.4–10.8)

## 2025-01-13 PROCEDURE — 0202U NFCT DS 22 TRGT SARS-COV-2: CPT | Performed by: EMERGENCY MEDICINE

## 2025-01-13 PROCEDURE — 84484 ASSAY OF TROPONIN QUANT: CPT | Performed by: EMERGENCY MEDICINE

## 2025-01-13 PROCEDURE — 83880 ASSAY OF NATRIURETIC PEPTIDE: CPT | Performed by: EMERGENCY MEDICINE

## 2025-01-13 PROCEDURE — 94761 N-INVAS EAR/PLS OXIMETRY MLT: CPT

## 2025-01-13 PROCEDURE — 83605 ASSAY OF LACTIC ACID: CPT | Performed by: EMERGENCY MEDICINE

## 2025-01-13 PROCEDURE — 93005 ELECTROCARDIOGRAM TRACING: CPT | Performed by: EMERGENCY MEDICINE

## 2025-01-13 PROCEDURE — 36415 COLL VENOUS BLD VENIPUNCTURE: CPT

## 2025-01-13 PROCEDURE — 80053 COMPREHEN METABOLIC PANEL: CPT | Performed by: EMERGENCY MEDICINE

## 2025-01-13 PROCEDURE — 82803 BLOOD GASES ANY COMBINATION: CPT | Performed by: EMERGENCY MEDICINE

## 2025-01-13 PROCEDURE — 87040 BLOOD CULTURE FOR BACTERIA: CPT | Performed by: EMERGENCY MEDICINE

## 2025-01-13 PROCEDURE — 93005 ELECTROCARDIOGRAM TRACING: CPT

## 2025-01-13 PROCEDURE — 99285 EMERGENCY DEPT VISIT HI MDM: CPT

## 2025-01-13 PROCEDURE — 36600 WITHDRAWAL OF ARTERIAL BLOOD: CPT | Performed by: EMERGENCY MEDICINE

## 2025-01-13 PROCEDURE — 94799 UNLISTED PULMONARY SVC/PX: CPT

## 2025-01-13 PROCEDURE — 85025 COMPLETE CBC W/AUTO DIFF WBC: CPT | Performed by: EMERGENCY MEDICINE

## 2025-01-13 PROCEDURE — 71045 X-RAY EXAM CHEST 1 VIEW: CPT

## 2025-01-13 PROCEDURE — 94640 AIRWAY INHALATION TREATMENT: CPT

## 2025-01-13 RX ORDER — ISOSORBIDE MONONITRATE 30 MG/1
1 TABLET, EXTENDED RELEASE ORAL DAILY
COMMUNITY
End: 2025-01-14

## 2025-01-13 RX ORDER — IPRATROPIUM BROMIDE AND ALBUTEROL SULFATE 2.5; .5 MG/3ML; MG/3ML
3 SOLUTION RESPIRATORY (INHALATION) ONCE
Status: COMPLETED | OUTPATIENT
Start: 2025-01-13 | End: 2025-01-13

## 2025-01-13 RX ORDER — MELOXICAM 15 MG/1
1 TABLET ORAL DAILY
COMMUNITY
End: 2025-01-14

## 2025-01-13 RX ORDER — TAMSULOSIN HYDROCHLORIDE 0.4 MG/1
1 CAPSULE ORAL DAILY
COMMUNITY
End: 2025-01-14

## 2025-01-13 RX ORDER — BACLOFEN 20 MG/1
1 TABLET ORAL 3 TIMES DAILY
COMMUNITY
End: 2025-01-14

## 2025-01-13 RX ORDER — FLUTICASONE FUROATE, UMECLIDINIUM BROMIDE AND VILANTEROL TRIFENATATE 100; 62.5; 25 UG/1; UG/1; UG/1
1 POWDER RESPIRATORY (INHALATION)
COMMUNITY
End: 2025-01-14

## 2025-01-13 RX ORDER — CITALOPRAM HYDROBROMIDE 40 MG/1
1 TABLET ORAL DAILY
COMMUNITY
Start: 2024-10-31 | End: 2025-01-14

## 2025-01-13 RX ORDER — HYDROXYZINE HYDROCHLORIDE 50 MG/1
50 TABLET, FILM COATED ORAL EVERY 6 HOURS PRN
COMMUNITY
End: 2025-01-14

## 2025-01-13 RX ORDER — GABAPENTIN 800 MG/1
1 TABLET ORAL 3 TIMES DAILY
COMMUNITY
End: 2025-01-14

## 2025-01-13 RX ORDER — SODIUM CHLORIDE 0.9 % (FLUSH) 0.9 %
10 SYRINGE (ML) INJECTION AS NEEDED
Status: DISCONTINUED | OUTPATIENT
Start: 2025-01-13 | End: 2025-01-17 | Stop reason: HOSPADM

## 2025-01-13 RX ORDER — PRAVASTATIN SODIUM 40 MG
1 TABLET ORAL
COMMUNITY
Start: 2024-10-31 | End: 2025-01-14

## 2025-01-13 RX ORDER — DULOXETIN HYDROCHLORIDE 60 MG/1
60 CAPSULE, DELAYED RELEASE ORAL DAILY
COMMUNITY
Start: 2024-10-31 | End: 2025-01-14

## 2025-01-13 RX ORDER — MECLIZINE HYDROCHLORIDE 25 MG/1
1 TABLET ORAL 3 TIMES DAILY PRN
COMMUNITY
End: 2025-01-17 | Stop reason: HOSPADM

## 2025-01-13 RX ORDER — ALBUTEROL SULFATE 0.83 MG/ML
2.5 SOLUTION RESPIRATORY (INHALATION) ONCE
Status: COMPLETED | OUTPATIENT
Start: 2025-01-13 | End: 2025-01-13

## 2025-01-13 RX ORDER — ALLOPURINOL 100 MG/1
1 TABLET ORAL DAILY
COMMUNITY
End: 2025-01-14

## 2025-01-13 RX ADMIN — ALBUTEROL SULFATE 2.5 MG: 2.5 SOLUTION RESPIRATORY (INHALATION) at 23:12

## 2025-01-13 RX ADMIN — IPRATROPIUM BROMIDE AND ALBUTEROL SULFATE 3 ML: .5; 3 SOLUTION RESPIRATORY (INHALATION) at 21:40

## 2025-01-13 NOTE — LETTER
EMS Transport Request  For use at [facility], [place], [place], [place], and [place] only   Patient Name: Poncho Dalton Sr. : 1950   Weight:99.8 kg (220 lb) Pick-up Location: 256-1 BLS/ALS: BLS/ALS: BLS   Insurance: [employer] MED[employer]ARE REPLACEMENT Auth End Date:    Pre-Cert #: D/C Summary complete:    Destination: Other [facility] Room Room 256   Contact Precautions: None   Equipment (O2, Fluids, etc.): O2, settings 3L   Arrive By Date/Time: 25 (Not to arrive prior to 3p) Stretcher/WC: Wheelchair   CM Requesting: Belen Rodney RN Ext:    Notes/Medical Necessity: Assist of 2 for transfers.      ______________________________________________________________________    *Only 2 patient bags OR 1 carry-on size bag are permitted.  Wheelchairs and walkers CANNOT transported with the patient. Acknowledge: Yes

## 2025-01-13 NOTE — Clinical Note
Level of Care: Telemetry [5]   Admitting Physician: MARK PATRICK [[id number]]   Attending Physician: MARK PATRICK [[id number]]

## 2025-01-14 ENCOUNTER — APPOINTMENT (OUTPATIENT)
Dept: ULTRASOUND IMAGING | Facility: HOSPITAL | Age: 75
DRG: 190 | End: 2025-01-14
Payer: MEDICARE

## 2025-01-14 ENCOUNTER — APPOINTMENT (OUTPATIENT)
Dept: CARDIOLOGY | Facility: HOSPITAL | Age: 75
DRG: 190 | End: 2025-01-14
Payer: MEDICARE

## 2025-01-14 PROBLEM — J44.1 COPD EXACERBATION: Status: ACTIVE | Noted: 2025-01-14

## 2025-01-14 PROBLEM — J96.21 ACUTE ON CHRONIC RESPIRATORY FAILURE WITH HYPOXIA: Status: ACTIVE | Noted: 2025-01-14

## 2025-01-14 LAB
ANION GAP SERPL CALCULATED.3IONS-SCNC: 12.2 MMOL/L (ref 5–15)
B PARAPERT DNA SPEC QL NAA+PROBE: NOT DETECTED
B PERT DNA SPEC QL NAA+PROBE: NOT DETECTED
BASOPHILS # BLD AUTO: 0.01 10*3/MM3 (ref 0–0.2)
BASOPHILS NFR BLD AUTO: 0.2 % (ref 0–1.5)
BH CV LOWER ARTERIAL LEFT ABI RATIO: 0.86
BH CV LOWER ARTERIAL LEFT CALF RATIO: 0.95
BH CV LOWER ARTERIAL LEFT DORSALIS PEDIS SYS MAX: 107
BH CV LOWER ARTERIAL LEFT GREAT TOE SYS MAX: 80
BH CV LOWER ARTERIAL LEFT LOW THIGH RATIO: 0.98
BH CV LOWER ARTERIAL LEFT LOW THIGH SYS MAX: 122
BH CV LOWER ARTERIAL LEFT POPLITEAL SYS MAX: 119
BH CV LOWER ARTERIAL LEFT POST TIBIAL SYS MAX: 93
BH CV LOWER ARTERIAL LEFT TBI RATIO: 0.64
BH CV LOWER ARTERIAL RIGHT ABI RATIO: 0.74
BH CV LOWER ARTERIAL RIGHT CALF RATIO: 0.75
BH CV LOWER ARTERIAL RIGHT DORSALIS PEDIS SYS MAX: 92
BH CV LOWER ARTERIAL RIGHT GREAT TOE SYS MAX: 47
BH CV LOWER ARTERIAL RIGHT LOW THIGH RATIO: 0.98
BH CV LOWER ARTERIAL RIGHT LOW THIGH SYS MAX: 122
BH CV LOWER ARTERIAL RIGHT POPLITEAL SYS MAX: 94
BH CV LOWER ARTERIAL RIGHT POST TIBIAL SYS MAX: 81
BH CV LOWER ARTERIAL RIGHT TBI RATIO: 0.38
BUN SERPL-MCNC: 29 MG/DL (ref 8–23)
BUN/CREAT SERPL: 18.1 (ref 7–25)
C PNEUM DNA NPH QL NAA+NON-PROBE: NOT DETECTED
CALCIUM SPEC-SCNC: 9.2 MG/DL (ref 8.6–10.5)
CHLORIDE SERPL-SCNC: 99 MMOL/L (ref 98–107)
CO2 SERPL-SCNC: 23.8 MMOL/L (ref 22–29)
CREAT SERPL-MCNC: 1.6 MG/DL (ref 0.76–1.27)
DEPRECATED RDW RBC AUTO: 47.3 FL (ref 37–54)
EGFRCR SERPLBLD CKD-EPI 2021: 45.2 ML/MIN/1.73
EOSINOPHIL # BLD AUTO: 0 10*3/MM3 (ref 0–0.4)
EOSINOPHIL NFR BLD AUTO: 0 % (ref 0.3–6.2)
ERYTHROCYTE [DISTWIDTH] IN BLOOD BY AUTOMATED COUNT: 15.1 % (ref 12.3–15.4)
FLUAV SUBTYP SPEC NAA+PROBE: NOT DETECTED
FLUBV RNA ISLT QL NAA+PROBE: NOT DETECTED
GLUCOSE SERPL-MCNC: 123 MG/DL (ref 65–99)
HADV DNA SPEC NAA+PROBE: NOT DETECTED
HCOV 229E RNA SPEC QL NAA+PROBE: NOT DETECTED
HCOV HKU1 RNA SPEC QL NAA+PROBE: NOT DETECTED
HCOV NL63 RNA SPEC QL NAA+PROBE: NOT DETECTED
HCOV OC43 RNA SPEC QL NAA+PROBE: NOT DETECTED
HCT VFR BLD AUTO: 40.1 % (ref 37.5–51)
HGB BLD-MCNC: 13.1 G/DL (ref 13–17.7)
HMPV RNA NPH QL NAA+NON-PROBE: NOT DETECTED
HPIV1 RNA ISLT QL NAA+PROBE: NOT DETECTED
HPIV2 RNA SPEC QL NAA+PROBE: NOT DETECTED
HPIV3 RNA NPH QL NAA+PROBE: NOT DETECTED
HPIV4 P GENE NPH QL NAA+PROBE: NOT DETECTED
IMM GRANULOCYTES # BLD AUTO: 0.03 10*3/MM3 (ref 0–0.05)
IMM GRANULOCYTES NFR BLD AUTO: 0.5 % (ref 0–0.5)
LYMPHOCYTES # BLD AUTO: 0.79 10*3/MM3 (ref 0.7–3.1)
LYMPHOCYTES NFR BLD AUTO: 12.2 % (ref 19.6–45.3)
M PNEUMO IGG SER IA-ACNC: NOT DETECTED
MCH RBC QN AUTO: 29 PG (ref 26.6–33)
MCHC RBC AUTO-ENTMCNC: 32.7 G/DL (ref 31.5–35.7)
MCV RBC AUTO: 88.7 FL (ref 79–97)
MONOCYTES # BLD AUTO: 0.04 10*3/MM3 (ref 0.1–0.9)
MONOCYTES NFR BLD AUTO: 0.6 % (ref 5–12)
NEUTROPHILS NFR BLD AUTO: 5.6 10*3/MM3 (ref 1.7–7)
NEUTROPHILS NFR BLD AUTO: 86.5 % (ref 42.7–76)
NRBC BLD AUTO-RTO: 0 /100 WBC (ref 0–0.2)
NT-PROBNP SERPL-MCNC: 363 PG/ML (ref 0–900)
PLATELET # BLD AUTO: 388 10*3/MM3 (ref 140–450)
PMV BLD AUTO: 8.6 FL (ref 6–12)
POTASSIUM SERPL-SCNC: 5.1 MMOL/L (ref 3.5–5.2)
QT INTERVAL: 388 MS
QTC INTERVAL: 436 MS
RBC # BLD AUTO: 4.52 10*6/MM3 (ref 4.14–5.8)
RHINOVIRUS RNA SPEC NAA+PROBE: NOT DETECTED
RSV RNA NPH QL NAA+NON-PROBE: NOT DETECTED
SARS-COV-2 RNA RESP QL NAA+PROBE: NOT DETECTED
SODIUM SERPL-SCNC: 135 MMOL/L (ref 136–145)
UPPER ARTERIAL LEFT ARM BRACHIAL SYS MAX: 125
WBC NRBC COR # BLD AUTO: 6.47 10*3/MM3 (ref 3.4–10.8)

## 2025-01-14 PROCEDURE — 94799 UNLISTED PULMONARY SVC/PX: CPT

## 2025-01-14 PROCEDURE — 25010000002 FUROSEMIDE PER 20 MG: Performed by: INTERNAL MEDICINE

## 2025-01-14 PROCEDURE — 25810000003 SODIUM CHLORIDE 0.9 % SOLUTION 250 ML FLEX CONT: Performed by: INTERNAL MEDICINE

## 2025-01-14 PROCEDURE — 97166 OT EVAL MOD COMPLEX 45 MIN: CPT | Performed by: OCCUPATIONAL THERAPIST

## 2025-01-14 PROCEDURE — 25010000002 AZITHROMYCIN PER 500 MG: Performed by: INTERNAL MEDICINE

## 2025-01-14 PROCEDURE — 93923 UPR/LXTR ART STDY 3+ LVLS: CPT

## 2025-01-14 PROCEDURE — 76775 US EXAM ABDO BACK WALL LIM: CPT

## 2025-01-14 PROCEDURE — 25010000002 AMPICILLIN-SULBACTAM PER 1.5 G: Performed by: INTERNAL MEDICINE

## 2025-01-14 PROCEDURE — 83880 ASSAY OF NATRIURETIC PEPTIDE: CPT

## 2025-01-14 PROCEDURE — 94761 N-INVAS EAR/PLS OXIMETRY MLT: CPT

## 2025-01-14 PROCEDURE — 63710000001 PREDNISONE PER 1 MG: Performed by: INTERNAL MEDICINE

## 2025-01-14 PROCEDURE — 99222 1ST HOSP IP/OBS MODERATE 55: CPT | Performed by: STUDENT IN AN ORGANIZED HEALTH CARE EDUCATION/TRAINING PROGRAM

## 2025-01-14 PROCEDURE — 94660 CPAP INITIATION&MGMT: CPT

## 2025-01-14 PROCEDURE — 25010000002 ENOXAPARIN PER 10 MG: Performed by: INTERNAL MEDICINE

## 2025-01-14 PROCEDURE — 93923 UPR/LXTR ART STDY 3+ LVLS: CPT | Performed by: SURGERY

## 2025-01-14 PROCEDURE — 85025 COMPLETE CBC W/AUTO DIFF WBC: CPT | Performed by: INTERNAL MEDICINE

## 2025-01-14 PROCEDURE — 97161 PT EVAL LOW COMPLEX 20 MIN: CPT | Performed by: PHYSICAL THERAPIST

## 2025-01-14 PROCEDURE — G0378 HOSPITAL OBSERVATION PER HR: HCPCS

## 2025-01-14 PROCEDURE — 94664 DEMO&/EVAL PT USE INHALER: CPT

## 2025-01-14 PROCEDURE — 80048 BASIC METABOLIC PNL TOTAL CA: CPT | Performed by: INTERNAL MEDICINE

## 2025-01-14 RX ORDER — GABAPENTIN 400 MG/1
800 CAPSULE ORAL EVERY 8 HOURS SCHEDULED
Status: DISCONTINUED | OUTPATIENT
Start: 2025-01-14 | End: 2025-01-17 | Stop reason: HOSPADM

## 2025-01-14 RX ORDER — CITALOPRAM HYDROBROMIDE 20 MG/1
20 TABLET ORAL DAILY
Status: DISCONTINUED | OUTPATIENT
Start: 2025-01-14 | End: 2025-01-17 | Stop reason: HOSPADM

## 2025-01-14 RX ORDER — TAMSULOSIN HYDROCHLORIDE 0.4 MG/1
0.4 CAPSULE ORAL DAILY
Status: DISCONTINUED | OUTPATIENT
Start: 2025-01-14 | End: 2025-01-17 | Stop reason: HOSPADM

## 2025-01-14 RX ORDER — SODIUM CHLORIDE 0.9 % (FLUSH) 0.9 %
10 SYRINGE (ML) INJECTION AS NEEDED
Status: DISCONTINUED | OUTPATIENT
Start: 2025-01-14 | End: 2025-01-17 | Stop reason: HOSPADM

## 2025-01-14 RX ORDER — FUROSEMIDE 10 MG/ML
20 INJECTION INTRAMUSCULAR; INTRAVENOUS ONCE
Status: COMPLETED | OUTPATIENT
Start: 2025-01-14 | End: 2025-01-14

## 2025-01-14 RX ORDER — POLYETHYLENE GLYCOL 3350 17 G/17G
17 POWDER, FOR SOLUTION ORAL DAILY PRN
Status: DISCONTINUED | OUTPATIENT
Start: 2025-01-14 | End: 2025-01-17 | Stop reason: HOSPADM

## 2025-01-14 RX ORDER — NALOXONE HCL 0.4 MG/ML
0.4 VIAL (ML) INJECTION
Status: DISCONTINUED | OUTPATIENT
Start: 2025-01-14 | End: 2025-01-17 | Stop reason: HOSPADM

## 2025-01-14 RX ORDER — HYDROCODONE BITARTRATE AND ACETAMINOPHEN 10; 325 MG/1; MG/1
1 TABLET ORAL EVERY 8 HOURS PRN
Status: DISCONTINUED | OUTPATIENT
Start: 2025-01-14 | End: 2025-01-17 | Stop reason: HOSPADM

## 2025-01-14 RX ORDER — ISOSORBIDE MONONITRATE 30 MG/1
30 TABLET, EXTENDED RELEASE ORAL DAILY
Status: DISCONTINUED | OUTPATIENT
Start: 2025-01-14 | End: 2025-01-17 | Stop reason: HOSPADM

## 2025-01-14 RX ORDER — BISACODYL 5 MG/1
5 TABLET, DELAYED RELEASE ORAL DAILY PRN
Status: DISCONTINUED | OUTPATIENT
Start: 2025-01-14 | End: 2025-01-17 | Stop reason: HOSPADM

## 2025-01-14 RX ORDER — AMOXICILLIN 250 MG
2 CAPSULE ORAL 2 TIMES DAILY PRN
Status: DISCONTINUED | OUTPATIENT
Start: 2025-01-14 | End: 2025-01-17 | Stop reason: HOSPADM

## 2025-01-14 RX ORDER — PREDNISONE 20 MG/1
40 TABLET ORAL
Status: DISCONTINUED | OUTPATIENT
Start: 2025-01-14 | End: 2025-01-17 | Stop reason: HOSPADM

## 2025-01-14 RX ORDER — ASPIRIN 81 MG/1
81 TABLET ORAL DAILY
Status: DISCONTINUED | OUTPATIENT
Start: 2025-01-14 | End: 2025-01-17 | Stop reason: HOSPADM

## 2025-01-14 RX ORDER — MORPHINE SULFATE 2 MG/ML
1 INJECTION, SOLUTION INTRAMUSCULAR; INTRAVENOUS EVERY 4 HOURS PRN
Status: DISCONTINUED | OUTPATIENT
Start: 2025-01-14 | End: 2025-01-17 | Stop reason: HOSPADM

## 2025-01-14 RX ORDER — SODIUM CHLORIDE 9 MG/ML
40 INJECTION, SOLUTION INTRAVENOUS AS NEEDED
Status: DISCONTINUED | OUTPATIENT
Start: 2025-01-14 | End: 2025-01-17 | Stop reason: HOSPADM

## 2025-01-14 RX ORDER — IPRATROPIUM BROMIDE AND ALBUTEROL SULFATE 2.5; .5 MG/3ML; MG/3ML
3 SOLUTION RESPIRATORY (INHALATION)
Status: DISPENSED | OUTPATIENT
Start: 2025-01-14 | End: 2025-01-16

## 2025-01-14 RX ORDER — PANTOPRAZOLE SODIUM 40 MG/1
40 TABLET, DELAYED RELEASE ORAL
Status: DISCONTINUED | OUTPATIENT
Start: 2025-01-14 | End: 2025-01-17 | Stop reason: HOSPADM

## 2025-01-14 RX ORDER — ALBUTEROL SULFATE 0.83 MG/ML
2.5 SOLUTION RESPIRATORY (INHALATION) EVERY 4 HOURS PRN
Status: DISCONTINUED | OUTPATIENT
Start: 2025-01-14 | End: 2025-01-17 | Stop reason: HOSPADM

## 2025-01-14 RX ORDER — SULFAMETHOXAZOLE AND TRIMETHOPRIM 800; 160 MG/1; MG/1
1 TABLET ORAL 2 TIMES DAILY
COMMUNITY
Start: 2025-01-14 | End: 2025-01-17 | Stop reason: HOSPADM

## 2025-01-14 RX ORDER — ONDANSETRON 4 MG/1
4 TABLET, ORALLY DISINTEGRATING ORAL EVERY 6 HOURS PRN
Status: DISCONTINUED | OUTPATIENT
Start: 2025-01-14 | End: 2025-01-17 | Stop reason: HOSPADM

## 2025-01-14 RX ORDER — MECLIZINE HYDROCHLORIDE 25 MG/1
25 TABLET ORAL 2 TIMES DAILY PRN
Status: DISCONTINUED | OUTPATIENT
Start: 2025-01-14 | End: 2025-01-15

## 2025-01-14 RX ORDER — BISACODYL 10 MG
10 SUPPOSITORY, RECTAL RECTAL DAILY PRN
Status: DISCONTINUED | OUTPATIENT
Start: 2025-01-14 | End: 2025-01-17 | Stop reason: HOSPADM

## 2025-01-14 RX ORDER — ACETAMINOPHEN 325 MG/1
650 TABLET ORAL EVERY 6 HOURS PRN
Status: DISCONTINUED | OUTPATIENT
Start: 2025-01-14 | End: 2025-01-17 | Stop reason: HOSPADM

## 2025-01-14 RX ORDER — ATORVASTATIN CALCIUM 10 MG/1
10 TABLET, FILM COATED ORAL DAILY
Status: DISCONTINUED | OUTPATIENT
Start: 2025-01-14 | End: 2025-01-17 | Stop reason: HOSPADM

## 2025-01-14 RX ORDER — SODIUM CHLORIDE 0.9 % (FLUSH) 0.9 %
10 SYRINGE (ML) INJECTION EVERY 12 HOURS SCHEDULED
Status: DISCONTINUED | OUTPATIENT
Start: 2025-01-14 | End: 2025-01-17 | Stop reason: HOSPADM

## 2025-01-14 RX ORDER — ENOXAPARIN SODIUM 100 MG/ML
40 INJECTION SUBCUTANEOUS DAILY
Status: DISCONTINUED | OUTPATIENT
Start: 2025-01-14 | End: 2025-01-17 | Stop reason: HOSPADM

## 2025-01-14 RX ORDER — BACLOFEN 10 MG/1
20 TABLET ORAL 2 TIMES DAILY PRN
Status: DISCONTINUED | OUTPATIENT
Start: 2025-01-14 | End: 2025-01-17 | Stop reason: HOSPADM

## 2025-01-14 RX ORDER — ALLOPURINOL 100 MG/1
100 TABLET ORAL DAILY
Status: DISCONTINUED | OUTPATIENT
Start: 2025-01-14 | End: 2025-01-17 | Stop reason: HOSPADM

## 2025-01-14 RX ORDER — ONDANSETRON 2 MG/ML
4 INJECTION INTRAMUSCULAR; INTRAVENOUS EVERY 6 HOURS PRN
Status: DISCONTINUED | OUTPATIENT
Start: 2025-01-14 | End: 2025-01-17 | Stop reason: HOSPADM

## 2025-01-14 RX ORDER — HYDROXYZINE HYDROCHLORIDE 25 MG/1
50 TABLET, FILM COATED ORAL EVERY 6 HOURS PRN
Status: DISCONTINUED | OUTPATIENT
Start: 2025-01-14 | End: 2025-01-17 | Stop reason: HOSPADM

## 2025-01-14 RX ADMIN — ALLOPURINOL 100 MG: 100 TABLET ORAL at 09:25

## 2025-01-14 RX ADMIN — FUROSEMIDE 20 MG: 10 INJECTION, SOLUTION INTRAMUSCULAR; INTRAVENOUS at 02:55

## 2025-01-14 RX ADMIN — IPRATROPIUM BROMIDE AND ALBUTEROL SULFATE 3 ML: .5; 3 SOLUTION RESPIRATORY (INHALATION) at 01:41

## 2025-01-14 RX ADMIN — GABAPENTIN 800 MG: 400 CAPSULE ORAL at 15:10

## 2025-01-14 RX ADMIN — IPRATROPIUM BROMIDE AND ALBUTEROL SULFATE 3 ML: .5; 3 SOLUTION RESPIRATORY (INHALATION) at 08:03

## 2025-01-14 RX ADMIN — ISOSORBIDE MONONITRATE 30 MG: 30 TABLET, EXTENDED RELEASE ORAL at 09:25

## 2025-01-14 RX ADMIN — GABAPENTIN 800 MG: 400 CAPSULE ORAL at 07:33

## 2025-01-14 RX ADMIN — IPRATROPIUM BROMIDE AND ALBUTEROL SULFATE 3 ML: .5; 3 SOLUTION RESPIRATORY (INHALATION) at 15:29

## 2025-01-14 RX ADMIN — IPRATROPIUM BROMIDE AND ALBUTEROL SULFATE 3 ML: .5; 3 SOLUTION RESPIRATORY (INHALATION) at 23:03

## 2025-01-14 RX ADMIN — Medication 10 ML: at 02:56

## 2025-01-14 RX ADMIN — ENOXAPARIN SODIUM 40 MG: 100 INJECTION SUBCUTANEOUS at 15:44

## 2025-01-14 RX ADMIN — AMPICILLIN SODIUM AND SULBACTAM SODIUM 3000 MG: 2; 1 INJECTION, POWDER, FOR SOLUTION INTRAMUSCULAR; INTRAVENOUS at 02:55

## 2025-01-14 RX ADMIN — ASPIRIN 81 MG: 81 TABLET, COATED ORAL at 09:25

## 2025-01-14 RX ADMIN — CITALOPRAM 20 MG: 20 TABLET, FILM COATED ORAL at 09:25

## 2025-01-14 RX ADMIN — Medication 10 ML: at 21:26

## 2025-01-14 RX ADMIN — PANTOPRAZOLE SODIUM 40 MG: 40 TABLET, DELAYED RELEASE ORAL at 07:33

## 2025-01-14 RX ADMIN — Medication 10 ML: at 09:25

## 2025-01-14 RX ADMIN — AZITHROMYCIN MONOHYDRATE 500 MG: 500 INJECTION, POWDER, LYOPHILIZED, FOR SOLUTION INTRAVENOUS at 02:56

## 2025-01-14 RX ADMIN — AMPICILLIN SODIUM AND SULBACTAM SODIUM 3000 MG: 2; 1 INJECTION, POWDER, FOR SOLUTION INTRAMUSCULAR; INTRAVENOUS at 15:11

## 2025-01-14 RX ADMIN — PREDNISONE 40 MG: 20 TABLET ORAL at 02:55

## 2025-01-14 RX ADMIN — AMPICILLIN SODIUM AND SULBACTAM SODIUM 3000 MG: 2; 1 INJECTION, POWDER, FOR SOLUTION INTRAMUSCULAR; INTRAVENOUS at 20:50

## 2025-01-14 RX ADMIN — TAMSULOSIN HYDROCHLORIDE 0.4 MG: 0.4 CAPSULE ORAL at 09:25

## 2025-01-14 RX ADMIN — ATORVASTATIN CALCIUM 10 MG: 10 TABLET ORAL at 09:25

## 2025-01-14 RX ADMIN — GABAPENTIN 800 MG: 400 CAPSULE ORAL at 21:28

## 2025-01-14 RX ADMIN — AMPICILLIN SODIUM AND SULBACTAM SODIUM 3000 MG: 2; 1 INJECTION, POWDER, FOR SOLUTION INTRAMUSCULAR; INTRAVENOUS at 07:32

## 2025-01-14 NOTE — PLAN OF CARE
Goal Outcome Evaluation:  Plan of Care Reviewed With: patient           Outcome Evaluation: Pt is a 74-year-old male with history of COPD on 3 L of oxygen at home  who presented to ED on 01/13/25 with complains of increasing shortness of breath & cough over the last couple days, inc LE weakness & inability to walk. Pt admitted with COPD exac, acute on chronic hypoxic resp failure, hyperkalemia, LE weakness & STEPHEN. At baseline, pt reports he lives with dtr & JL. His dtr assists him with all BADLs & pt amb short distances using a rollator. He uses 3L O2 at home at all times. Upon eval, pt is A&O X4. He c/o pain in herbie-area from wounds & right foot. He has limited shoulder flex flex/abd AROM & requires assist for UB ADLs, grooming & needs s/u for meals. He needs assist X2 for bed mobility & sit<>stand at EOB.  He requires max A for LB ADLs & pericare. Pt unable to ambulate this date due to LE weakness, dec standing balance, dec LE GMC & generalized dec activity tolerance. Pt is functioning below his baseline status & is at increased risk for falls; therefore, OT will continue to follow for tx & recommends SNF upon discharge.    Anticipated Discharge Disposition (OT): skilled nursing facility                         ST DAILY TREATMENT NOTE/ SPEECH EVAL Patient Name: Amber Ferraro Date:10/9/2018 : 1968 [x]  Patient  Verified Payor: [employer] / Plan: [address] [place] [address] / Product Type: Managed Care Medicaid / In time:2:45  Out time:3:30 Total Treatment Time (min): 45 Visit #: 1 of 6 SUBJECTIVE Pain Level (0-10 scale): 0 Any medication changes, allergies to medications, adverse drug reactions, diagnosis change, or new procedure performed?: [x] No    [] Yes (see summary sheet for update) Subjective functional status/changes:   [] No changes reported I want, \"to decrease trauma to my voice. \" 
Date of Onset:  Social History: Surgeon Prior Functional level: Normal vocal quality Radiology: Endoscopy performed by ENT OBJECTIVE 
 
OUTPATIENT SPEECH-LANGUAGE EVALUATION Receptive Language: 
Receptive vocabulary    [x] WNL    [] Impaired [] Mild [] Mod [] Severe Reliability of yes/no responses to questions [x] WNL    [] Impaired [] Mild [] Mod [] Severe Reliability of responses to complex ideation [x] WNL    [] Impaired [] Mild [] Mod [] Severe Auditory retention/processing   [x] WNL    [] Impaired [] Mild [] Mod [] Severe Follow commands [] 1 Step [] 2 Step [] 3 Step [x] complex Expressive Language Automatic speech   [x] WNL    [] Impaired [] Mild [] Mod [] Severe Able to identify objects/pictures  [x] WNL    [] Impaired [] Mild [] Mod [] Severe Preservations    [x] WNL    [] Impaired [] Mild [] Mod [] Severe Word retrieval    [x] WNL    [] Impaired [] Mild [] Mod [] Severe Paraphasias   [x]None[] Literal    [] Phenomic   [] Jargon   [] Semantic Able to make needs known at level [x] Word   [x] Phrase   [x] Sentence   [] Gesture  
     [] Unable Writing: [] L or [] R [x] WNL    [] Impaired @ [] Word [] Sentence [] Paragraph Reading:  [x] WNL    [] Impaired @ [] Word [] Sentence [] Paragraph Speech: 
 Oral Verbal Apraxia: [x] None    [] Mild    [] Moderate    [] Severe Dysarthria:  [x] None    [] Mild    [] Moderate    [] Severe Intelligibility:  [x] WNL    [] Words   [] Phrases   [] Sentences   [] Conversation 
    % Intelligible: 
Voice:   [] WNL    [x] Hoarse   [] Breathy   Comments: Harsh, hoarse, reduced pitch Fluency:  [] WNL    [] Dysfluent: 
 
 
 
Patient/Caregiver instruction/education: [x] Review HEP    [] Progressed/Changed HEP/Handouts given: Look over vocal hygiene tips Pain Level (0-10 scale) post treatment: 0 
 
ASSESSMENT [x]  See Plan of Care Short Term Goals: To be accomplished in 6 weeks 1) Pt will complete vocal fold stretches x15 with min cues in order to promote relaxation to the laryngeal area for phonation. 2) Pt will complete step 1 of Resonant Voice Therapy with no more than 2 cues for more forward-focused resonance to reduce laryngeal tension during phonation. 3) Pt will be able to state and explain at least 3 vocal hygiene tips to increase knowledge and use of strategies in day-to-day life. 
  
 
Long Term Goals: To be accomplished in 8 weeks 1) Pt will demonstrate improved vocal quality during phonation with min cues for forward resonance in conversations with family, friends, patients, and colleagues.            
   
PLAN 
[]  Upgrade activities as tolerated     [x]  Continue plan of care 
[]  Discharge due to:__ 
[] Other:__ Angela Sy, SLP 10/9/2018  3:36 PM

## 2025-01-14 NOTE — CONSULTS
[facility] CONSULT NOTE    Referring Provider: Coty Flower MD   Reason for Consultation: Acute kidney injury    Chief complaint shortness of breath    History of present illness: Patient is 74 years old male with a history of COPD, BPH, hypertension, morbid obesity, brought to the hospital due to severe generalized weakness and shortness of breath.  Patient had some cough, cough but denies any expectoration, fever, nausea, vomiting, hematuria or dysuria.  Patient was tachycardic and tachypneic on presentation and his blood pressure was high.  Patient admitted with COPD exacerbation.  Patient was on NSAIDs in form of meloxicam as outpatient    History  History reviewed. No pertinent past medical history.  Past Surgical History:   Procedure Laterality Date    CARDIAC CATHETERIZATION       Social History     Tobacco Use    Smoking status: Every Day     Types: Cigarettes    Smokeless tobacco: Never   Vaping Use    Vaping status: Never Used   Substance Use Topics    Alcohol use: Defer    Drug use: Defer     History reviewed. No pertinent family history.    Review of Systems  ROS  As per HPI  Objective     Vital Signs  Temp:  [97 °F (36.1 °C)-98.1 °F (36.7 °C)] 97 °F (36.1 °C)  Heart Rate:  [] 107  Resp:  [12-20] 12  BP: ()/() 123/101    No intake/output data recorded.  No intake/output data recorded.    Physical Exam:  Physical Exam    General Appearance: Chronically ill-appearing, NAD  Skin: warm and dry  HEENT: oral mucosa normal, nonicteric sclera  Neck: supple, no JVD  Lungs: Decreased breath sound bases.  Few scattered wheezes bilaterally  Heart: RRR, normal S1 and S2  Abdomen: soft, nontender, nondistended  : no palpable bladder  Extremities: Trace bilateral lower extremities edema with venous stasis changes  Neuro: normal speech and mental status     Results Review:   I reviewed the patient's new clinical results.    Lab Results   Component Value Date    CALCIUM 9.2 01/14/2025  "    Results from last 7 days   Lab Units 01/14/25  0741 01/13/25  2132   SODIUM mmol/L 135* 136   POTASSIUM mmol/L 5.1 5.5*   CHLORIDE mmol/L 99 98   CO2 mmol/L 23.8 26.9   BUN mg/dL 29* 29*   CREATININE mg/dL 1.60* 1.56*   GLUCOSE mg/dL 123* 82   CALCIUM mg/dL 9.2 9.6   WBC 10*3/mm3 6.47 9.94   HEMOGLOBIN g/dL 13.1 13.9   PLATELETS 10*3/mm3 388 402   ALT (SGPT) U/L  --  15   AST (SGOT) U/L  --  32     Lab Results   Component Value Date    TROPONINT 27 (H) 01/13/2025     Estimated Creatinine Clearance: 45.5 mL/min (A) (by C-G formula based on SCr of 1.6 mg/dL (H)).No results found for: \"URICACID\"    Brief Urine Lab Results       None            Prior to Admission medications    Medication Sig Start Date End Date Taking? Authorizing Provider   albuterol sulfate  (90 Base) MCG/ACT inhaler  5/3/22  Yes Shad Alvarado MD   allopurinol (ZYLOPRIM) 100 MG tablet Take 1 tablet by mouth.   Yes Shad Alvarado MD   aspirin (aspirin) 81 MG EC tablet Take 1 tablet by mouth Daily. 7/8/22  Yes Adan Lutz MD   baclofen (LIORESAL) 20 MG tablet Take 1 tablet by mouth 3 (Three) Times a Day.   Yes Shad Alvarado MD   Calcium Carbonate-Vitamin D (Oyster Shell Calcium/D) 250-125 MG-UNIT tablet Take 1 tablet by mouth Daily.   Yes Shad Alvarado MD   cholecalciferol (VITAMIN D3) 25 MCG (1000 UT) tablet Take 1 tablet by mouth Daily.   Yes Shad Alvarado MD   citalopram (CeleXA) 40 MG tablet Take 1 tablet by mouth Daily.   Yes Shad Alvarado MD   DULoxetine (CYMBALTA) 60 MG capsule Take 1 capsule by mouth Daily.   Yes Shad Alvarado MD   gabapentin (NEURONTIN) 800 MG tablet Take 1 tablet by mouth 3 (Three) Times a Day.   Yes Shad Alvarado MD   HYDROcodone-acetaminophen (NORCO)  MG per tablet Take 1 tablet by mouth Every 6 (Six) Hours As Needed.   Yes Shad Alvarado MD   isosorbide mononitrate (IMDUR) 30 MG 24 hr tablet TAKE ONE TABLET BY MOUTH EVERY " MORNING 8/8/23  Yes Adan Lutz MD   meloxicam (MOBIC) 15 MG tablet Take 1 tablet by mouth Daily.   Yes Shad Alvarado MD   nitroglycerin (NITROSTAT) 0.4 MG SL tablet  5/3/22  Yes Shad Alvarado MD   omeprazole (priLOSEC) 20 MG capsule Take 1 capsule by mouth Daily.   Yes Shad Alvarado MD   pravastatin (PRAVACHOL) 40 MG tablet Take 1 tablet by mouth Daily.   Yes ProviderShad MD   tamsulosin (FLOMAX) 0.4 MG capsule 24 hr capsule Take 1 capsule by mouth Daily.   Yes Provider, MD Shad   ammonium lactate (LAC-HYDRIN) 12 % lotion  4/11/22 1/14/25 Yes Shad Alvarado MD   Cholecalciferol 125 MCG (5000 UT) tablet Take 1 tablet by mouth Daily. 3/6/24 1/14/25 Yes Shad Alvarado MD   citalopram (CeleXA) 40 MG tablet Take 1 tablet by mouth Daily. 10/31/24 1/14/25 Yes Shad Alvarado MD   DULoxetine (CYMBALTA) 60 MG capsule Take 1 capsule by mouth Daily. 10/31/24 1/14/25 Yes Shad Alvarado MD   meclizine (ANTIVERT) 25 MG tablet  7/5/22 1/14/25 Yes Shad Alvarado MD   pravastatin (PRAVACHOL) 40 MG tablet Take 1 tablet by mouth every night at bedtime. 10/31/24 1/14/25 Yes Shad Alvarado MD   meclizine (ANTIVERT) 25 MG tablet Take 1 tablet by mouth 3 (Three) Times a Day As Needed.    ProviderShad MD   sulfamethoxazole-trimethoprim (BACTRIM DS,SEPTRA DS) 800-160 MG per tablet Take 1 tablet by mouth 2 (Two) Times a Day. 1/14/25 1/24/25  Shad Alvarado MD   allopurinol (ZYLOPRIM) 100 MG tablet Take 1 tablet by mouth Daily.  1/14/25  Shad Alvarado MD   baclofen (LIORESAL) 20 MG tablet Take 1 tablet by mouth 3 (Three) Times a Day.  1/14/25  Shad Alvarado MD   Fluticasone-Umeclidin-Vilant (Trelegy Ellipta) 100-62.5-25 MCG/ACT inhaler Inhale 1 puff Daily.  1/14/25  Provider, MD Shad   gabapentin (NEURONTIN) 800 MG tablet Take 1 tablet by mouth 3 (Three) Times a Day.  1/14/25  Provider, MD Shad    hydrOXYzine (ATARAX) 50 MG tablet Take 1 tablet by mouth Every 6 (Six) Hours As Needed for Anxiety.  1/14/25  Shad Alvarado MD   isosorbide mononitrate (IMDUR) 30 MG 24 hr tablet Take 1 tablet by mouth Daily.  1/14/25  Shad Alvarado MD   meloxicam (MOBIC) 15 MG tablet Take 1 tablet by mouth Daily.  1/14/25  Shad Alvarado MD   omeprazole (priLOSEC) 20 MG capsule Take 1 capsule by mouth Daily.  1/14/25  Shad Alvarado MD   tamsulosin (FLOMAX) 0.4 MG capsule 24 hr capsule Take 1 capsule by mouth Daily.  1/14/25  Shad Alvarado MD       allopurinol, 100 mg, Oral, Daily  ampicillin-sulbactam, 3,000 mg, Intravenous, Q6H  aspirin, 81 mg, Oral, Daily  atorvastatin, 10 mg, Oral, Daily  azithromycin, 500 mg, Intravenous, Daily  citalopram, 20 mg, Oral, Daily  enoxaparin, 40 mg, Subcutaneous, Daily  gabapentin, 800 mg, Oral, Q8H  ipratropium-albuterol, 3 mL, Nebulization, Q6H - RT  isosorbide mononitrate, 30 mg, Oral, Daily  pantoprazole, 40 mg, Oral, Q AM  predniSONE, 40 mg, Oral, Daily With Breakfast  sodium chloride, 10 mL, Intravenous, Q12H  tamsulosin, 0.4 mg, Oral, Daily           Assessment & Plan       Acute kidney injury.  Seems to be prerenal in etiology related to COPD exacerbation.  Creatinine increased to 1.6 Mg/DL from normal baseline.  Mucous membranes dry has peripheral edema.  Hyperkalemia.  Mild.  Improved  COPD exacerbation.  Acute on chronic hypoxic respiratory failure.  Patient is on antibiotics and steroids  History of BPH.  On tamsulosin.  No significant obstructive symptoms  Benign essential hypertension.  Blood pressure was high on presentation but improving.  Back on outpatient  hypertensives    Plan:  Continue supportive care  I agree holding NSAIDs  Ordered urine sodium and urinalysis  I will reevaluate in morning and start gentle IV hydration if creatinine still high  Rule out obstruction    I discussed the patients findings and my recommendations with  patient and nursing staff    Juan Balderas MD  01/14/25  14:08 EST

## 2025-01-14 NOTE — PLAN OF CARE
Goal Outcome Evaluation:  Plan of Care Reviewed With: patient        Progress: improving  Outcome Evaluation: Patient is currently on 5 L of O2. Placed on female pericked with pull ups over it for urinary urgency. PT is recommending SNF, as he's a x2 assist. Daughter likely won't be able to give him the care that he needs. Patient can feed himself but might ask for assistance doing so. Still needing clean catch urine.

## 2025-01-14 NOTE — ED PROVIDER NOTES
Subjective   History of Present Illness  Chief complaint shortness of breath    History of present illness a 74-year-old male with history of COPD on 3 L of oxygen at home complains of increasing shortness of breath over the last couple days.  He had some cough but no production.  He denies any fever chills no chest pain neck arm jaw pain no recent long car ride plane ride immobilization foreign travels antibiotic use or hospitalization.  No one at home with similar illness.  No leg pain or swelling out of the ordinary.  Patient given a DuoNeb and route as well as 125 mg Solu-Medrol IV      Review of Systems   Constitutional:  Negative for chills and fever.   HENT:  Positive for congestion.    Respiratory:  Positive for shortness of breath. Negative for chest tightness.    Cardiovascular:  Negative for chest pain and leg swelling.   Gastrointestinal:  Negative for abdominal pain and vomiting.   Genitourinary:  Negative for difficulty urinating and dysuria.   Musculoskeletal:  Negative for back pain.   Skin:  Negative for rash.   Neurological:  Negative for dizziness and light-headedness.   Psychiatric/Behavioral:  Negative for confusion.        History reviewed. No pertinent past medical history.    No Known Allergies    Past Surgical History:   Procedure Laterality Date    CARDIAC CATHETERIZATION         History reviewed. No pertinent family history.    Social History     Socioeconomic History    Marital status:    Tobacco Use    Smoking status: Every Day     Types: Cigarettes    Smokeless tobacco: Never   Vaping Use    Vaping status: Never Used   Substance and Sexual Activity    Alcohol use: Defer    Drug use: Defer    Sexual activity: Defer     Prior to Admission medications    Medication Sig Start Date End Date Taking? Authorizing Provider   albuterol sulfate  (90 Base) MCG/ACT inhaler  5/3/22  Yes Provider, MD Shad   allopurinol (ZYLOPRIM) 100 MG tablet Take 1 tablet by mouth.   Yes  Provider, Historical, MD   ammonium lactate (LAC-HYDRIN) 12 % lotion  4/11/22  Yes Provider, Historical, MD   aspirin (aspirin) 81 MG EC tablet Take 1 tablet by mouth Daily. 7/8/22  Yes Adan Lutz MD   baclofen (LIORESAL) 20 MG tablet  5/25/22  Yes Provider, MD Shad   cholecalciferol (VITAMIN D3) 25 MCG (1000 UT) tablet Take 1 tablet by mouth Daily.   Yes Provider, MD Shad   Cholecalciferol 125 MCG (5000 UT) tablet Take 1 tablet by mouth Daily. 3/6/24  Yes Provider, MD Shad   citalopram (CeleXA) 40 MG tablet  5/6/22  Yes Provider, MD Shad   citalopram (CeleXA) 40 MG tablet Take 1 tablet by mouth Daily. 10/31/24  Yes Provider, MD Shad   DULoxetine (CYMBALTA) 60 MG capsule  6/21/22  Yes Provider, MD Shad   DULoxetine (CYMBALTA) 60 MG capsule Take 1 capsule by mouth Daily. 10/31/24  Yes Provider, MD Shad   gabapentin (NEURONTIN) 800 MG tablet  7/5/22  Yes Provider, Historical, MD   HYDROcodone-acetaminophen (NORCO)  MG per tablet  7/7/22  Yes Provider, Historical, MD   isosorbide mononitrate (IMDUR) 30 MG 24 hr tablet TAKE ONE TABLET BY MOUTH EVERY MORNING 8/8/23  Yes Adan Lutz MD   meclizine (ANTIVERT) 25 MG tablet  7/5/22  Yes Provider, Historical, MD   meloxicam (MOBIC) 15 MG tablet  4/30/22  Yes Provider, Historical, MD   nitroglycerin (NITROSTAT) 0.4 MG SL tablet  5/3/22  Yes Provider, Historical, MD   omeprazole (priLOSEC) 20 MG capsule  5/3/22  Yes Provider, Historical, MD   pravastatin (PRAVACHOL) 40 MG tablet Take  by mouth.   Yes Provider, MD Shad   pravastatin (PRAVACHOL) 40 MG tablet Take 1 tablet by mouth every night at bedtime. 10/31/24  Yes Provider, MD Shad   tamsulosin (FLOMAX) 0.4 MG capsule 24 hr capsule  6/21/22  Yes Provider, Historical, MD   allopurinol (ZYLOPRIM) 100 MG tablet Take 1 tablet by mouth Daily.    Provider, MD Shad   baclofen (LIORESAL) 20 MG tablet Take 1 tablet by mouth 3  (Three) Times a Day.    Shad Alvarado MD   Calcium Carbonate-Vitamin D (Oyster Shell Calcium/D) 250-125 MG-UNIT tablet Take 1 tablet by mouth Daily.    Shad Alvarado MD   Fluticasone-Umeclidin-Vilant (Trelegy Ellipta) 100-62.5-25 MCG/ACT inhaler Inhale 1 puff Daily.    Shad Alvarado MD   gabapentin (NEURONTIN) 800 MG tablet Take 1 tablet by mouth 3 (Three) Times a Day.    Shad Alvarado MD   hydrOXYzine (ATARAX) 50 MG tablet Take 1 tablet by mouth Every 6 (Six) Hours As Needed for Anxiety.    Shad Alvarado MD   isosorbide mononitrate (IMDUR) 30 MG 24 hr tablet Take 1 tablet by mouth Daily.    Shad Alvarado MD   meclizine (ANTIVERT) 25 MG tablet Take 1 tablet by mouth 3 (Three) Times a Day As Needed.    Shad Alvarado MD   meloxicam (MOBIC) 15 MG tablet Take 1 tablet by mouth Daily.    Shad Alvarado MD   omeprazole (priLOSEC) 20 MG capsule Take 1 capsule by mouth Daily.    Shad Alvarado MD   tamsulosin (FLOMAX) 0.4 MG capsule 24 hr capsule Take 1 capsule by mouth Daily.    Shad Alvarado MD          Objective   Physical Exam  Constitutional this is a 74-year-old male awake alert audibly wheezing but nontoxic and chronic ill-appearing triage vital signs reviewed he is on 5 L of oxygen satting in no low 90s.  HEENT extraocular muscles intact pupils equal round reactive sclera clear mouth clear neck supple no adenopathy no JV no bruits lungs diffuse scattered wheezes no retraction no use of accessories heart regular without murmur rub abdomen soft nontender good bowel sounds no peritoneal findings no pulsatile masses extremities pulses are equal he is got some chronic venous stasis and chronic wounds on the legs but no secondary cellulitic changes.  No cords or Homans' sign or evidence of DVT neurologic awake alert and follows commands motor strength normal without focal weakness  Procedures           ED Course      Results for orders placed or  performed during the hospital encounter of 01/13/25   ECG 12 Lead Dyspnea    Collection Time: 01/13/25  8:36 PM   Result Value Ref Range    QT Interval 388 ms    QTC Interval 436 ms   Comprehensive Metabolic Panel    Collection Time: 01/13/25  9:32 PM    Specimen: Blood   Result Value Ref Range    Glucose 82 65 - 99 mg/dL    BUN 29 (H) 8 - 23 mg/dL    Creatinine 1.56 (H) 0.76 - 1.27 mg/dL    Sodium 136 136 - 145 mmol/L    Potassium 5.5 (H) 3.5 - 5.2 mmol/L    Chloride 98 98 - 107 mmol/L    CO2 26.9 22.0 - 29.0 mmol/L    Calcium 9.6 8.6 - 10.5 mg/dL    Total Protein 8.0 6.0 - 8.5 g/dL    Albumin 4.0 3.5 - 5.2 g/dL    ALT (SGPT) 15 1 - 41 U/L    AST (SGOT) 32 1 - 40 U/L    Alkaline Phosphatase 110 39 - 117 U/L    Total Bilirubin 0.3 0.0 - 1.2 mg/dL    Globulin 4.0 gm/dL    A/G Ratio 1.0 g/dL    BUN/Creatinine Ratio 18.6 7.0 - 25.0    Anion Gap 11.1 5.0 - 15.0 mmol/L    eGFR 46.6 (L) >60.0 mL/min/1.73   BNP    Collection Time: 01/13/25  9:32 PM    Specimen: Blood   Result Value Ref Range    proBNP 143.0 0.0 - 900.0 pg/mL   High Sensitivity Troponin T    Collection Time: 01/13/25  9:32 PM    Specimen: Blood   Result Value Ref Range    HS Troponin T 28 (H) <22 ng/L   CBC Auto Differential    Collection Time: 01/13/25  9:32 PM    Specimen: Blood   Result Value Ref Range    WBC 9.94 3.40 - 10.80 10*3/mm3    RBC 4.63 4.14 - 5.80 10*6/mm3    Hemoglobin 13.9 13.0 - 17.7 g/dL    Hematocrit 42.6 37.5 - 51.0 %    MCV 92.0 79.0 - 97.0 fL    MCH 30.0 26.6 - 33.0 pg    MCHC 32.6 31.5 - 35.7 g/dL    RDW 15.1 12.3 - 15.4 %    RDW-SD 50.4 37.0 - 54.0 fl    MPV 8.6 6.0 - 12.0 fL    Platelets 402 140 - 450 10*3/mm3    Neutrophil % 84.5 (H) 42.7 - 76.0 %    Lymphocyte % 11.6 (L) 19.6 - 45.3 %    Monocyte % 2.8 (L) 5.0 - 12.0 %    Eosinophil % 0.1 (L) 0.3 - 6.2 %    Basophil % 0.3 0.0 - 1.5 %    Immature Grans % 0.7 (H) 0.0 - 0.5 %    Neutrophils, Absolute 8.40 (H) 1.70 - 7.00 10*3/mm3    Lymphocytes, Absolute 1.15 0.70 - 3.10 10*3/mm3     Monocytes, Absolute 0.28 0.10 - 0.90 10*3/mm3    Eosinophils, Absolute 0.01 0.00 - 0.40 10*3/mm3    Basophils, Absolute 0.03 0.00 - 0.20 10*3/mm3    Immature Grans, Absolute 0.07 (H) 0.00 - 0.05 10*3/mm3    nRBC 0.0 0.0 - 0.2 /100 WBC   POC Lactate    Collection Time: 01/13/25  9:39 PM    Specimen: Blood   Result Value Ref Range    Lactate 1.3 0.3 - 2.0 mmol/L   Blood Gas, Arterial -    Collection Time: 01/13/25  9:41 PM    Specimen: Arterial Blood   Result Value Ref Range    Site Left Radial     Milton's Test Positive     pH, Arterial 7.473 (H) 7.350 - 7.450 pH units    pCO2, Arterial 36.1 35.0 - 48.0 mm Hg    pO2, Arterial 96.5 83.0 - 108.0 mm Hg    HCO3, Arterial 26.5 21.0 - 28.0 mmol/L    Base Excess, Arterial 3.0 0.0 - 3.0 mmol/L    O2 Saturation, Arterial 98.0 94.0 - 98.0 %    CO2 Content 27.6 22 - 29 mmol/L    Barometric Pressure for Blood Gas      Modality Cannula     FIO2 40 %    Hemodilution No     PO2/FIO2 241 0 - 500   Gold Top - SST    Collection Time: 01/13/25 10:05 PM   Result Value Ref Range    Extra Tube Hold for add-ons.    High Sensitivity Troponin T 1Hr    Collection Time: 01/13/25 10:38 PM    Specimen: Blood   Result Value Ref Range    HS Troponin T 27 (H) <22 ng/L    Troponin T Numeric Delta -1 ng/L    Troponin T % Delta -4 Abnormal if >/= 20% %     XR Chest 1 View    Result Date: 1/13/2025  Impression: Low lung volumes. Bilateral perihilar interstitial thickening may be on the basis of interstitial edema. Electronically Signed: Dick Forman MD  1/13/2025 10:11 PM EST  Workstation ID: [id number]   Medications   sodium chloride 0.9 % flush 10 mL (has no administration in time range)   ipratropium-albuterol (DUO-NEB) nebulizer solution 3 mL (3 mL Nebulization Given 1/13/25 2140)   albuterol (PROVENTIL) nebulizer solution 0.083% 2.5 mg/3mL (2.5 mg Nebulization Given 1/13/25 2230)                                            EKG my interpretation normal sinus rhythm rate of 76 is a PVC normal axis no  hypertrophy QTc of 436 of the PVC EKG otherwise unremarkable            Medical Decision Making  Medical decision making.  Patient had IV established monitor placement review of sinus rhythm.  Labs obtained by independent review comprehensive metabolic profile BUN 29 creatinine 1.56 potassium 5.5 but hemolyzed proBNP 143 troponin was 28 CBC unremarkable.  Lactic acid 1.3 blood gas pH 7.4 pCO2 36 pO2 of 96 on 5 L of oxygen troponin was down to 27.  The patient had a chest x-ray my independent review chronic lung disease I do not see pneumonia pneumothorax or failure radiology low lung volumes bilateral perihilar interstitial thickening may be basis of interstitial edema.  Patient was given a DuoNeb and additional albuterol he has Solu-Medrol prior to arrival and another DuoNeb prior to arrival.  EKG my interpretation normal sinus rhythm rate of 76 PVC noted no hypertrophy normal axis QTc was 436 otherwise unremarkable.  The patient reexamination still with about 4 L of oxygen satting about 92%.  Respiratory rate about 22.  Still has some scattered wheezes throughout.  Patient does not feel back to baseline he is on 4 L of oxygen.  I do not see evidence of pneumonia pneumothorax failure DVT pulmonary embolism aortic dissection sepsis bacteremia although not a complete list of all possibilities.  Hospitalist notified case discussed and patient will be admitted to hospital for further care stable unremarkable course    Problems Addressed:  Acute respiratory distress: complicated acute illness or injury  Chronic obstructive pulmonary disease with acute exacerbation: complicated acute illness or injury    Amount and/or Complexity of Data Reviewed  Labs: ordered. Decision-making details documented in ED Course.  Radiology: ordered and independent interpretation performed. Decision-making details documented in ED Course.  ECG/medicine tests: ordered and independent interpretation performed. Decision-making details  documented in ED Course.    Risk  Decision regarding hospitalization.        Final diagnoses:   Acute respiratory distress   Chronic obstructive pulmonary disease with acute exacerbation       ED Disposition  ED Disposition       ED Disposition   Decision to Admit    Condition   --    Comment   Level of Care: Med/Surg [1]   Diagnosis: COPD exacerbation [218031]                 No follow-up provider specified.       Medication List      No changes were made to your prescriptions during this visit.            Cj Brewer MD  01/14/25 0026     Spine appears normal, movement of extremities grossly intact.

## 2025-01-14 NOTE — THERAPY EVALUATION
Patient Name: Poncho Dalton .  : 1950    MRN: [id number]                              Today's Date: 2025       Admit Date: 2025    Visit Dx:     ICD-10-CM ICD-9-CM   1. Acute respiratory distress  R06.03 518.82   2. Chronic obstructive pulmonary disease with acute exacerbation  J44.1 491.21     Patient Active Problem List   Diagnosis    Benign prostatic hyperplasia    Chronic obstructive pulmonary disease    Depressive disorder    Essential hypertension    Fatigue    Gastroesophageal reflux disease    Generalized anxiety disorder    Gout    Hyperlipidemia    Impairment of balance    Obstructive sleep apnea syndrome    COPD with acute exacerbation    Acute on chronic hypoxic respiratory failure    Hyperkalemia    COPD exacerbation    Acute on chronic respiratory failure with hypoxia     History reviewed. No pertinent past medical history.  Past Surgical History:   Procedure Laterality Date    CARDIAC CATHETERIZATION        General Information       Row Name 25 1433          Physical Therapy Time and Intention    Document Type evaluation  -AD     Mode of Treatment physical therapy  -AD       Row Name 25 1433          General Information    Patient Profile Reviewed yes  -AD     Prior Level of Function min assist:;bed mobility;ADL's  assist from daughter at home  -AD     Existing Precautions/Restrictions fall;oxygen therapy device and L/min  -AD     Barriers to Rehab medically complex;previous functional deficit  -AD       Row Name 25 1433          Living Environment    People in Home child(kodi), adult  -AD       Row Name 25 1433          Home Main Entrance    Number of Stairs, Main Entrance none  -AD       Row Name 25 1433          Safety Issues/Impairments Affecting Functional Mobility    Impairments Affecting Function (Mobility) balance;cognition;coordination;endurance/activity tolerance;pain;strength;range of motion (ROM);postural/trunk control  -AD                User Key  (r) = Recorded By, (t) = Taken By, (c) = Cosigned By      Initials Name Provider Type    Jeannette Danielle, RAUL Physical Therapist                   Mobility       Row Name 01/14/25 1434          Bed Mobility    Bed Mobility bed mobility (all) activities  -AD     All Activities, Ponce (Bed Mobility) moderate assist (50% patient effort)  -AD     Assistive Device (Bed Mobility) bed rails;head of bed elevated;repositioning sheet  -AD       Row Name 01/14/25 1434          Sit-Stand Transfer    Sit-Stand Ponce (Transfers) moderate assist (50% patient effort);2 person assist  -AD       Row Name 01/14/25 1434          Gait/Stairs (Locomotion)    Ponce Level (Gait) unable to assess  -AD     Patient was able to Ambulate no, other medical factors prevent ambulation  -AD     Reason Patient was unable to Ambulate Excessive Weakness  -AD               User Key  (r) = Recorded By, (t) = Taken By, (c) = Cosigned By      Initials Name Provider Type    AD Jeannette Mera PT Physical Therapist                   Obj/Interventions       Row Name 01/14/25 1434          Range of Motion Comprehensive    Comment, General Range of Motion hip ROM 50% limited, knee and ankle WFL  -AD       Row Name 01/14/25 1434          Strength Comprehensive (MMT)    Comment, General Manual Muscle Testing (MMT) Assessment 3+/5 hip flex, 4-/5 knee ext  -AD               User Key  (r) = Recorded By, (t) = Taken By, (c) = Cosigned By      Initials Name Provider Type    Jeannette Danielle PT Physical Therapist                   Goals/Plan       Row Name 01/14/25 1439          Bed Mobility Goal 1 (PT)    Activity/Assistive Device (Bed Mobility Goal 1, PT) bed mobility activities, all  -AD     Ponce Level/Cues Needed (Bed Mobility Goal 1, PT) contact guard required  -AD     Time Frame (Bed Mobility Goal 1, PT) long term goal (LTG)  -AD       Row Name 01/14/25 1439          Transfer Goal 1 (PT)    Activity/Assistive  Device (Transfer Goal 1, PT) transfers, all  -AD     Riley Level/Cues Needed (Transfer Goal 1, PT) contact guard required  -AD     Time Frame (Transfer Goal 1, PT) long term goal (LTG)  -AD       Row Name 01/14/25 1439          Gait Training Goal 1 (PT)    Activity/Assistive Device (Gait Training Goal 1, PT) gait (walking locomotion);assistive device use  -AD     Riley Level (Gait Training Goal 1, PT) minimum assist (75% or more patient effort)  -AD     Distance (Gait Training Goal 1, PT) 20  -AD     Time Frame (Gait Training Goal 1, PT) long term goal (LTG)  -AD       Row Name 01/14/25 1439          Therapy Assessment/Plan (PT)    Planned Therapy Interventions (PT) balance training;bed mobility training;gait training;home exercise program;patient/family education;postural re-education;transfer training;stretching;strengthening;ROM (range of motion);neuromuscular re-education  -AD               User Key  (r) = Recorded By, (t) = Taken By, (c) = Cosigned By      Initials Name Provider Type    AD Jeannette Mera, PT Physical Therapist                   Clinical Impression       Row Name 01/14/25 1435          Pain    Pretreatment Pain Rating 5/10  -AD     Posttreatment Pain Rating 5/10  -AD     Pain Location buttock  -AD       Row Name 01/14/25 1435          Plan of Care Review    Plan of Care Reviewed With patient  -AD     Progress no change  -AD     Outcome Evaluation 74 y.o. male with a CMH of COPD, chonic 3L o2 at home who presented to [facility] on 1/13/2025 with increasing shortness of breath over the last couple days. Admitted for acute COPD exacerbation and acute on chronic hypoxic respiratory failure. Also having LE numbness and weakness possibly from PAD.  Pt lives with his daughter and son in law in a 0STE home who help with ADLs, and he uses a rollator for household ambulation. He uses 3LO2 at home. Today he was able to perform bed mobility with modAx2, and modA x2 for sit<>Stand.  He was not able to ambulate d/t significant weakness. Recommend SNF at discharge as he is at a high falls risk and is significantly below his baseline function.  -AD       Row Name 01/14/25 1435          Therapy Assessment/Plan (PT)    Patient/Family Therapy Goals Statement (PT) get better  -AD     Rehab Potential (PT) good  -AD     Criteria for Skilled Interventions Met (PT) yes;skilled treatment is necessary  -AD     Therapy Frequency (PT) 3 times/wk  -AD               User Key  (r) = Recorded By, (t) = Taken By, (c) = Cosigned By      Initials Name Provider Type    AD Jeannette Mera, PT Physical Therapist                   Outcome Measures       Row Name 01/14/25 0800          How much help from another person do you currently need...    Turning from your back to your side while in flat bed without using bedrails? 2  -TC     Moving from lying on back to sitting on the side of a flat bed without bedrails? 2  -TC     Moving to and from a bed to a chair (including a wheelchair)? 2  -TC     Standing up from a chair using your arms (e.g., wheelchair, bedside chair)? 2  -TC     Climbing 3-5 steps with a railing? 2  -TC     To walk in hospital room? 2  -TC     AM-PAC 6 Clicks Score (PT) 12  -TC               User Key  (r) = Recorded By, (t) = Taken By, (c) = Cosigned By      Initials Name Provider Type    Mike Menard RN Registered Nurse                                 Physical Therapy Education       Title: PT OT SLP Therapies (In Progress)       Topic: Physical Therapy (In Progress)       Point: Mobility training (Done)       Learning Progress Summary            Patient Acceptance, E, VU by AD at 1/14/2025 1430                      Point: Home exercise program (Not Started)       Learner Progress:  Not documented in this visit.              Point: Body mechanics (Not Started)       Learner Progress:  Not documented in this visit.              Point: Precautions (Not Started)       Learner Progress:  Not  documented in this visit.                              User Key       Initials Effective Dates Name Provider Type Discipline    AD 06/03/24 -  Jeannette Mera, PT Physical Therapist PT                  PT Recommendation and Plan  Planned Therapy Interventions (PT): balance training, bed mobility training, gait training, home exercise program, patient/family education, postural re-education, transfer training, stretching, strengthening, ROM (range of motion), neuromuscular re-education  Progress: no change  Outcome Evaluation: 74 y.o. male with a CMH of COPD, chonic 3L o2 at home who presented to [facility] on 1/13/2025 with increasing shortness of breath over the last couple days. Admitted for acute COPD exacerbation and acute on chronic hypoxic respiratory failure. Also having LE numbness and weakness possibly from PAD.  Pt lives with his daughter and son in law in a 0STE home who help with ADLs, and he uses a rollator for household ambulation. He uses 3LO2 at home. Today he was able to perform bed mobility with modAx2, and modA x2 for sit<>Stand. He was not able to ambulate d/t significant weakness. Recommend SNF at discharge as he is at a high falls risk and is significantly below his baseline function.     Time Calculation:   PT Evaluation Complexity  History, PT Evaluation Complexity: 1-2 personal factors and/or comorbidities  Examination of Body Systems (PT Eval Complexity): 1-2 elements  Clinical Presentation (PT Evaluation Complexity): stable  Clinical Decision Making (PT Evaluation Complexity): low complexity  Overall Complexity (PT Evaluation Complexity): low complexity     PT Charges       Row Name 01/14/25 1432             Time Calculation    Start Time 1000  -AD      Stop Time 1029  -AD      Time Calculation (min) 29 min  -AD      PT Received On 01/14/25  -AD      PT - Next Appointment 01/16/25  -AD      PT Goal Re-Cert Due Date 01/28/25  -AD         Time Calculation- PT    Total Timed Code  Minutes- PT 0 minute(s)  -AD                User Key  (r) = Recorded By, (t) = Taken By, (c) = Cosigned By      Initials Name Provider Type    AD Jeannette Mera, PT Physical Therapist                  Therapy Charges for Today       Code Description Service Date Service Provider Modifiers Qty    81940669262 HC PT EVAL LOW COMPLEXITY 4 1/14/2025 Jeannette Mera, RAUL GP 1            PT G-Codes  AM-PAC 6 Clicks Score (PT): 12  PT Discharge Summary  Anticipated Discharge Disposition (PT): skilled nursing facility    Jeannette Mera PT  1/14/2025

## 2025-01-14 NOTE — PLAN OF CARE
Goal Outcome Evaluation:  Plan of Care Reviewed With: patient        Progress: no change  Outcome Evaluation: 74 y.o. male with a CMH of COPD, chonic 3L o2 at home who presented to [facility] on 1/13/2025 with increasing shortness of breath over the last couple days. Admitted for acute COPD exacerbation and acute on chronic hypoxic respiratory failure. Also having LE numbness and weakness possibly from PAD.  Pt lives with his daughter and son in law in a 0STE home who help with ADLs, and he uses a rollator for household ambulation. He uses 3LO2 at home. Today he was able to perform bed mobility with modAx2, and modA x2 for sit<>Stand. He was not able to ambulate d/t significant weakness. Recommend SNF at discharge as he is at a high falls risk and is significantly below his baseline function.    Anticipated Discharge Disposition (PT): skilled nursing facility

## 2025-01-14 NOTE — PROGRESS NOTES
[facility] MEDICINE SERVICE  DAILY PROGRESS NOTE    NAME: Poncho Dalton Sr.  : 1950  MRN: [id number]      LOS: 0 days     PROVIDER OF SERVICE: Coty Flower MD    Chief Complaint: COPD with acute exacerbation    Subjective:     Interval History:  History taken from: patient    Patient seen and examined at bedside this morning.  States feeling short of breath over the last few days.  Denies having any cough.  Uses 3 L at home for COPD, currently on 4 L        Review of Systems: Negative except as described above  Review of Systems    Objective:     Vital Signs  Temp:  [97 °F (36.1 °C)-98.1 °F (36.7 °C)] 97 °F (36.1 °C)  Heart Rate:  [] 107  Resp:  [12-20] 12  BP: ()/() 123/101  Flow (L/min) (Oxygen Therapy):  [3-5] 4   Body mass index is 35.58 kg/m².    Physical Exam  Physical Exam  HENT:      Head: Normocephalic.   Cardiovascular:      Rate and Rhythm: Normal rate and regular rhythm.   Pulmonary:      Breath sounds: Normal breath sounds.   Abdominal:      General: Bowel sounds are normal.   Musculoskeletal:         General: Normal range of motion.   Neurological:      General: No focal deficit present.      Mental Status: He is alert and oriented to person, place, and time. Mental status is at baseline.   Psychiatric:         Mood and Affect: Mood normal.            Diagnostic Data    Results from last 7 days   Lab Units 25  0741 25  2132   WBC 10*3/mm3 6.47 9.94   HEMOGLOBIN g/dL 13.1 13.9   HEMATOCRIT % 40.1 42.6   PLATELETS 10*3/mm3 388 402   GLUCOSE mg/dL 123* 82   CREATININE mg/dL 1.60* 1.56*   BUN mg/dL 29* 29*   SODIUM mmol/L 135* 136   POTASSIUM mmol/L 5.1 5.5*   AST (SGOT) U/L  --  32   ALT (SGPT) U/L  --  15   ALK PHOS U/L  --  110   BILIRUBIN mg/dL  --  0.3   ANION GAP mmol/L 12.2 11.1       XR Chest 1 View    Result Date: 2025  Impression: Low lung volumes. Bilateral perihilar interstitial thickening may be on the basis of interstitial  edema. Electronically Signed: Dick Forman MD  1/13/2025 10:11 PM EST  Workstation ID: [id number]       I reviewed the patient's new clinical results.    Assessment/Plan:     Active and Resolved Problems  Active Hospital Problems    Diagnosis  POA    **COPD with acute exacerbation [J44.1]  Unknown    COPD exacerbation [J44.1]  Yes    Acute on chronic respiratory failure with hypoxia [J96.21]  Yes    Acute on chronic hypoxic respiratory failure [J96.21]  Unknown    Hyperkalemia [E87.5]  Unknown    Hyperlipidemia [E78.5]  Yes    Chronic obstructive pulmonary disease [J44.9]  Yes    Obstructive sleep apnea syndrome [G47.33]  Yes    Impairment of balance [R26.89]  Yes    Gastroesophageal reflux disease [K21.9]  Yes    Essential hypertension [I10]  Yes    Depressive disorder [F32.A]  Yes    Benign prostatic hyperplasia [N40.0]  Yes    Generalized anxiety disorder [F41.1]  Yes      Resolved Hospital Problems   No resolved problems to display.       COPD exacerbation with acute on chronic hypoxic respiratory failure [3 L baseline]  -Chest x-ray showed bilateral perihilar interstitial thickening may be on the basis of interstitial edema.  TTE and BNP ordered  -Scheduled DuoNebs every 6 hours, prednisone 40 mg daily, azithromycin, Unasyn  -Pulmonary toileting  -Maintain SpO2 greater than 88% in a COPD patient     Lower extremity numbness and weakness with history of falling, likely signs of PAD  -PT/OT  -ABIs lower extremity bilaterally moderately reduced  -Vascular surgery consulted     Hyperkalemia-Lasix 20 mg x 1     STEPHEN versus CKD, unclear at this time  -Continue to trend creatinine, avoid nephrotoxic medications as able.  -Renal ultrasound ordered  -Nephrology consulted     Chronic pain-continue home Norco, gabapentin, as needed baclofen holding.  Meloxicam in the setting of poor kidney function.     ELVIS-CPAP/BiPAP ordered     GERD-PPI     HTN-restart hypertensive medication      BPH-tamsulosin     SHEILA-SSRI restart      Gout-allopurinol     HLD-statin    VTE Prophylaxis:  Pharmacologic VTE prophylaxis orders are present.             Disposition Planning:      Anticipated Date of Discharge: TBD         Time: 35 minutes     Code Status and Medical Interventions: CPR (Attempt to Resuscitate); Full Support   Ordered at: 01/14/25 0000     Level Of Support Discussed With:    Patient     Code Status (Patient has no pulse and is not breathing):    CPR (Attempt to Resuscitate)     Medical Interventions (Patient has pulse or is breathing):    Full Support       Signature: Electronically signed by Coty Flower MD, 01/14/25, 13:38 EST.  [facility] Hospitalist Team

## 2025-01-14 NOTE — H&P
[facility] Medicine Services  History & Physical    Patient Name: Poncho Dalton Sr.  : 1950  MRN: [id number]  Primary Care Physician:  Ronaldo Alvarez MD  Date of admission: 2025  Date and Time of Service: 2025 at 00:10 EST     Subjective      Chief Complaint:   Chief Complaint   Patient presents with    Shortness of Breath        History of Present Illness: Poncho Dalton Sr. is a 74 y.o. male with a CMH of COPD, chonic 3L o2 at home who presented to [facility] on 2025 with increasing shortness of breath over the last couple days.  Patient is a poor historian which may be related to his medical  understanding of question.  States that he has been having weak legs since around 9 PM day of arrival which made him concerned that he wanted to come into the hospital for further evaluation.  While EMS was en route patient was  noted to need 5 L nasal cannula to maintain oxygenation.  Patient given DuoNebs and Solu-Medrol en route.  -Afterward he admits that he has been having shortness of breath for the past 2 to 3 days in duration with associated symptoms of chills, dizziness, coughing.  Admits to constipation for the past 3 days in duration without a bowel movement.  Denies any sick contacts  -Vital signs in the ER significant for tachycardia, tachypnea, mild elevated blood pressure, patient tolerating nasal cannula.  Vital signs in the ER showed mild hyperkalemia, STEPHEN, mild interstitial edema noted on chest x-ray.  Patient was treated with DuoNeb and albuterol neb while in the ER.  Oxygen weaned down from 5 L to 3 L.    Review of Systems   Constitutional:  Positive for fatigue. Negative for chills, diaphoresis and fever.   HENT:  Negative for congestion, sneezing and sore throat.    Eyes:  Negative for visual disturbance.   Respiratory:  Positive for cough, shortness of breath and wheezing. Negative for chest tightness.    Cardiovascular:  Negative for chest pain,  palpitations and leg swelling.   Gastrointestinal:  Positive for constipation. Negative for abdominal distention, abdominal pain, diarrhea, nausea and vomiting.   Genitourinary:  Negative for decreased urine volume, difficulty urinating, dysuria, frequency and urgency.   Musculoskeletal:  Negative for arthralgias, gait problem and myalgias.   Skin:  Negative for color change, pallor and wound.   Neurological:  Positive for weakness (legs). Negative for dizziness, syncope, light-headedness and headaches.   Psychiatric/Behavioral:  Negative for agitation, behavioral problems, confusion and decreased concentration.        Personal History     History reviewed. No pertinent past medical history.    Past Surgical History:   Procedure Laterality Date    CARDIAC CATHETERIZATION         Family History: family history is not on file. Otherwise pertinent FHx was reviewed and not pertinent to current issue.    Social History:  reports that he has been smoking cigarettes. He has never used smokeless tobacco. Alcohol use questions deferred to the physician. Drug use questions deferred to the physician.    Home Medications:  Prior to Admission Medications       Prescriptions Last Dose Informant Patient Reported? Taking?    albuterol sulfate  (90 Base) MCG/ACT inhaler Past Month  Yes Yes    allopurinol (ZYLOPRIM) 100 MG tablet Past Week  Yes Yes    Take 1 tablet by mouth.    ammonium lactate (LAC-HYDRIN) 12 % lotion Past Week  Yes Yes    aspirin (aspirin) 81 MG EC tablet 1/13/2025  No Yes    Take 1 tablet by mouth Daily.    baclofen (LIORESAL) 20 MG tablet 1/12/2025  Yes Yes    Calcium Carbonate-Vitamin D (Oyster Shell Calcium/D) 250-125 MG-UNIT tablet   Yes No    Take 1 tablet by mouth Daily.    cholecalciferol (VITAMIN D3) 25 MCG (1000 UT) tablet 1/12/2025  Yes Yes    Take 1 tablet by mouth Daily.    citalopram (CeleXA) 40 MG tablet 1/12/2025  Yes Yes    DULoxetine (CYMBALTA) 60 MG capsule 1/12/2025  Yes Yes    gabapentin  (NEURONTIN) 800 MG tablet 1/12/2025  Yes Yes    HYDROcodone-acetaminophen (NORCO)  MG per tablet 1/12/2025  Yes Yes    isosorbide mononitrate (IMDUR) 30 MG 24 hr tablet 1/12/2025  No Yes    TAKE ONE TABLET BY MOUTH EVERY MORNING    meclizine (ANTIVERT) 25 MG tablet 1/12/2025  Yes Yes    meloxicam (MOBIC) 15 MG tablet 1/12/2025  Yes Yes    nitroglycerin (NITROSTAT) 0.4 MG SL tablet Past Month  Yes Yes    omeprazole (priLOSEC) 20 MG capsule 1/12/2025  Yes Yes    pravastatin (PRAVACHOL) 40 MG tablet 1/12/2025  Yes Yes    Take  by mouth.    tamsulosin (FLOMAX) 0.4 MG capsule 24 hr capsule 1/12/2025  Yes Yes              Allergies:  No Known Allergies    Objective      Vitals:   Temp:  [97.9 °F (36.6 °C)] 97.9 °F (36.6 °C)  Heart Rate:  [] 81  Resp:  [13-20] 20  BP: (124-150)/() 129/99  Body mass index is 38.41 kg/m².  Physical Exam  Vitals and nursing note reviewed.   Constitutional:       General: He is in acute distress.      Appearance: Normal appearance. He is obese. He is ill-appearing and toxic-appearing.   HENT:      Head: Normocephalic and atraumatic.      Nose: Nose normal.      Mouth/Throat:      Mouth: Mucous membranes are moist.      Pharynx: Oropharynx is clear.   Eyes:      General: No scleral icterus.     Extraocular Movements: Extraocular movements intact.      Conjunctiva/sclera: Conjunctivae normal.   Cardiovascular:      Rate and Rhythm: Normal rate and regular rhythm.      Pulses: Normal pulses.      Heart sounds: Normal heart sounds. No murmur heard.     No friction rub. No gallop.   Pulmonary:      Effort: Respiratory distress present.      Breath sounds: Wheezing present. No rhonchi or rales.   Abdominal:      General: Abdomen is flat. Bowel sounds are normal. There is no distension.      Palpations: Abdomen is soft.      Tenderness: There is no abdominal tenderness. There is no guarding.   Musculoskeletal:      Cervical back: Normal range of motion. No rigidity or tenderness.       Right lower leg: No edema.      Left lower leg: No edema.   Skin:     General: Skin is warm.      Coloration: Skin is not jaundiced or pale.      Comments: Lower extremity red bilaterally   Neurological:      General: No focal deficit present.      Mental Status: He is alert and oriented to person, place, and time. Mental status is at baseline.      Cranial Nerves: No cranial nerve deficit.      Sensory: Sensory deficit (decreased sensation noted on the anterior portion of lower extremity bilaterally) present.      Motor: No weakness.   Psychiatric:         Mood and Affect: Mood normal.         Behavior: Behavior normal.         Thought Content: Thought content normal.         Judgment: Judgment normal.         Diagnostic Data:  Lab Results (last 24 hours)       Procedure Component Value Units Date/Time    Respiratory Panel PCR w/COVID-19(SARS-CoV-2) DILEEP/RORO/YANELI/PAD/COR/JONG In-House, NP Swab in UTM/VTM, 2 HR TAT - Swab, Nasopharynx [[id number]] Collected: 01/13/25 2338    Specimen: Swab from Nasopharynx Updated: 01/13/25 2339    High Sensitivity Troponin T 1Hr [[id number]]  (Abnormal) Collected: 01/13/25 2238    Specimen: Blood Updated: 01/13/25 2305     HS Troponin T 27 ng/L      Troponin T Numeric Delta -1 ng/L      Troponin T % Delta -4 %     Narrative:      High Sensitive Troponin T Reference Range:  <14.0 ng/L- Negative Female for AMI  <22.0 ng/L- Negative Male for AMI  >=14 - Abnormal Female indicating possible myocardial injury.  >=22 - Abnormal Male indicating possible myocardial injury.   Clinicians would have to utilize clinical acumen, EKG, Troponin, and serial changes to determine if it is an Acute Myocardial Infarction or myocardial injury due to an underlying chronic condition.         Extra Tubes [[id number]] Collected: 01/13/25 2205    Specimen: Blood, Venous Line Updated: 01/13/25 2215    Narrative:      The following orders were created for panel order Extra Tubes.  Procedure                                Abnormality         Status                     ---------                               -----------         ------                     Gold Top - [facility][[id number]]                                   Final result               Light Blue Top[[id number]]                                                                Please view results for these tests on the individual orders.    Gold Top - [facility] [[id number]] Collected: 01/13/25 2205    Specimen: Blood Updated: 01/13/25 2215     Extra Tube Hold for add-ons.     Comment: Auto resulted.       Blood Culture - Blood, Hand, Left [[id number]] Collected: 01/13/25 2200    Specimen: Blood from Hand, Left Updated: 01/13/25 2202    Comprehensive Metabolic Panel [[id number]]  (Abnormal) Collected: 01/13/25 2132    Specimen: Blood Updated: 01/13/25 2202     Glucose 82 mg/dL      BUN 29 mg/dL      Creatinine 1.56 mg/dL      Sodium 136 mmol/L      Potassium 5.5 mmol/L      Chloride 98 mmol/L      CO2 26.9 mmol/L      Calcium 9.6 mg/dL      Total Protein 8.0 g/dL      Albumin 4.0 g/dL      ALT (SGPT) 15 U/L      AST (SGOT) 32 U/L      Alkaline Phosphatase 110 U/L      Total Bilirubin 0.3 mg/dL      Globulin 4.0 gm/dL      A/G Ratio 1.0 g/dL      BUN/Creatinine Ratio 18.6     Anion Gap 11.1 mmol/L      eGFR 46.6 mL/min/1.73     Narrative:      GFR Categories in Chronic Kidney Disease (CKD)      GFR Category          GFR (mL/min/1.73)    Interpretation  G1                     90 or greater         Normal or high (1)  G2                      60-89                Mild decrease (1)  G3a                   45-59                Mild to moderate decrease  G3b                   30-44                Moderate to severe decrease  G4                    15-29                Severe decrease  G5                    14 or less           Kidney failure          (1)In the absence of evidence of kidney disease, neither GFR category G1 or G2 fulfill the criteria for CKD.    eGFR calculation 2021 CKD-EPI  creatinine equation, which does not include race as a factor    BNP [[id number]]  (Normal) Collected: 01/13/25 2132    Specimen: Blood Updated: 01/13/25 2202     proBNP 143.0 pg/mL     Narrative:      This assay is used as an aid in the diagnosis of individuals suspected of having heart failure. It can be used as an aid in the diagnosis of acute decompensated heart failure (ADHF) in patients presenting with signs and symptoms of ADHF to the emergency department (ED). In addition, NT-proBNP of <300 pg/mL indicates ADHF is not likely.    Age Range Result Interpretation  NT-proBNP Concentration (pg/mL:      <50             Positive            >450                   Gray                 300-450                    Negative             <300    50-75           Positive            >900                  Gray                300-900                  Negative            <300      >75             Positive            >1800                  Gray                300-1800                  Negative            <300    High Sensitivity Troponin T [[id number]]  (Abnormal) Collected: 01/13/25 2132    Specimen: Blood Updated: 01/13/25 2202     HS Troponin T 28 ng/L     Narrative:      High Sensitive Troponin T Reference Range:  <14.0 ng/L- Negative Female for AMI  <22.0 ng/L- Negative Male for AMI  >=14 - Abnormal Female indicating possible myocardial injury.  >=22 - Abnormal Male indicating possible myocardial injury.   Clinicians would have to utilize clinical acumen, EKG, Troponin, and serial changes to determine if it is an Acute Myocardial Infarction or myocardial injury due to an underlying chronic condition.         Blood Gas, Arterial - [[id number]]  (Abnormal) Collected: 01/13/25 2141    Specimen: Arterial Blood Updated: 01/13/25 2150     Site Left Radial     Milton's Test Positive     pH, Arterial 7.473 pH units      pCO2, Arterial 36.1 mm Hg      pO2, Arterial 96.5 mm Hg      HCO3, Arterial 26.5 mmol/L      Base Excess, Arterial 3.0  mmol/L      Comment: Serial Number: [id number]:  [id number]        O2 Saturation, Arterial 98.0 %      CO2 Content 27.6 mmol/L      Barometric Pressure for Blood Gas --     Comment: N/A        Modality Cannula     FIO2 40 %      Hemodilution No     PO2/FIO2 241    CBC & Differential [[id number]]  (Abnormal) Collected: 01/13/25 2132    Specimen: Blood Updated: 01/13/25 2141    Narrative:      The following orders were created for panel order CBC & Differential.  Procedure                               Abnormality         Status                     ---------                               -----------         ------                     CBC Auto Differential[[id number]]        Abnormal            Final result                 Please view results for these tests on the individual orders.    CBC Auto Differential [[id number]]  (Abnormal) Collected: 01/13/25 2132    Specimen: Blood Updated: 01/13/25 2141     WBC 9.94 10*3/mm3      RBC 4.63 10*6/mm3      Hemoglobin 13.9 g/dL      Hematocrit 42.6 %      MCV 92.0 fL      MCH 30.0 pg      MCHC 32.6 g/dL      RDW 15.1 %      RDW-SD 50.4 fl      MPV 8.6 fL      Platelets 402 10*3/mm3      Neutrophil % 84.5 %      Lymphocyte % 11.6 %      Monocyte % 2.8 %      Eosinophil % 0.1 %      Basophil % 0.3 %      Immature Grans % 0.7 %      Neutrophils, Absolute 8.40 10*3/mm3      Lymphocytes, Absolute 1.15 10*3/mm3      Monocytes, Absolute 0.28 10*3/mm3      Eosinophils, Absolute 0.01 10*3/mm3      Basophils, Absolute 0.03 10*3/mm3      Immature Grans, Absolute 0.07 10*3/mm3      nRBC 0.0 /100 WBC     POC Lactate [[id number]]  (Normal) Collected: 01/13/25 2139    Specimen: Blood Updated: 01/13/25 2140     Lactate 1.3 mmol/L      Comment: Serial Number: [id number]:  [id number]       Blood Culture - Blood, Arm, Right [[id number]] Collected: 01/13/25 2132    Specimen: Blood from Arm, Right Updated: 01/13/25 2138             Imaging Results (Last 24 Hours)       Procedure Component Value  Units Date/Time    XR Chest 1 View [[id number]] Collected: 01/13/25 2210     Updated: 01/13/25 2213    Narrative:      XR CHEST 1 VW    Date of Exam: 1/13/2025 10:07 PM EST    Indication: Short of breath    Comparison: Noncontrast chest CT performed on January 6, 2020    Findings:  Lung volumes are low. Bilateral perihilar interstitial thickening is visualized. No focal consolidation or effusion. There is no evidence of pneumothorax. Calcified left hilar lymph nodes are visualized. Cardiac silhouette is enlarged. No acute osseous   abnormality identified.      Impression:      Impression:  Low lung volumes. Bilateral perihilar interstitial thickening may be on the basis of interstitial edema.      Electronically Signed: Dick Forman MD    1/13/2025 10:11 PM EST    Workstation ID: [id number]              Assessment & Plan        This is a 74 y.o. male with:    Active and Resolved Problems  Active Hospital Problems    Diagnosis  POA    **COPD with acute exacerbation [J44.1]  Unknown    COPD exacerbation [J44.1]  Yes    Acute on chronic hypoxic respiratory failure [J96.21]  Unknown    Hyperkalemia [E87.5]  Unknown    Hyperlipidemia [E78.5]  Yes    Chronic obstructive pulmonary disease [J44.9]  Yes    Obstructive sleep apnea syndrome [G47.33]  Yes    Impairment of balance [R26.89]  Yes    Gastroesophageal reflux disease [K21.9]  Yes    Essential hypertension [I10]  Yes    Depressive disorder [F32.A]  Yes    Benign prostatic hyperplasia [N40.0]  Yes    Generalized anxiety disorder [F41.1]  Yes      Resolved Hospital Problems   No resolved problems to display.     COPD exacerbation with acute on chronic hypoxic respiratory failure [3 L baseline]  -Scheduled DuoNebs every 6 hours, prednisone 40 mg daily, azithromycin, Unasyn  -Pulmonary toileting  -Maintain SpO2 greater than 88% in a COPD patient    Lower extremity numbness and weakness with history of falling, likely signs of PAD  -PT/OT  -ABIs lower extremity  bilaterally  -Consider vascular surgery consult in a.m.    Hyperkalemia-Lasix 20 mg x 1    STEPHEN versus CKD, unclear at this time  -Continue to trend creatinine, avoid nephrotoxic medications as able.    Chronic pain-continue home Norco, gabapentin, as needed baclofen holding.  Meloxicam in the setting of poor kidney function.    ELVIS-CPAP/BiPAP ordered    GERD-PPI    HTN-restart hypertensive medication soured fashion    BPH-tamsulosin    SHEILA-SSRI restart    Gout-allopurinol    HLD-statin    VTE Prophylaxis:  Pharmacologic VTE prophylaxis orders are signed & held.          The patient desires to be as follows:    CODE STATUS:    Level Of Support Discussed With: Patient  Code Status (Patient has no pulse and is not breathing): CPR (Attempt to Resuscitate)  Medical Interventions (Patient has pulse or is breathing): Full Support        Regis Collier, who can be contacted at [phone], is the designated person to make medical decisions on the patient's behalf if He is incapable of doing so. This was clarified with patient and/or next of kin on 1/13/2025 during the course of this H&P.    Admission Status:  I believe this patient meets Observation status.    Expected Length of Stay: 1-2 days    PDMP and Medication Dispenses via Sidebar reviewed and consistent with patient reported medications.    I discussed the patient's findings and my recommendations with patient.      Signature:     This document has been electronically signed by Jaylan Zee MD on January 14, 2025 00:10 [facility] Hospitalist Team

## 2025-01-14 NOTE — CONSULTS
Name: Poncho Dalton Sr. ADMIT: 2025   : 1950  PCP: Ronaldo Alvarez MD    MRN: [id number] LOS: 0 days   AGE/SEX: 74 y.o. male  ROOM: [address]      Patient Care Team:  Ronaldo Alvarez MD as PCP - Adan Mcghee MD as Consulting Physician (Cardiology)  Chief Complaint   Patient presents with    Shortness of Breath       CC: PAD    Subjective     Inpatient Vascular Surgery Consult  Consult performed by: Maddison Klein APRN  Consult ordered by: Coty Flower MD          Shortness of Breath      Poncho Dalton Sr. is a 74 y.o. male with a past medical history of COPD, home oxygen use, PAD, AAA, obesity, and tobacco abuse who presented to [facility] on 2025 for shortness of breath and lower extremity weakness.  On arrival, he was noted to be tachypneic, tachycardic, and mildly hypertensive.  Chest x-ray showed mild interstitial edema.  He was diagnosed with COPD exacerbation and admitted for further workup and care.  Due to his history of PAD and new complaints of lower extremity weakness, ABIs were performed, this demonstrated moderate arterial insufficiency in the right lower extremity 0.74 with digit pressure 47 mmHg and mild arterial insufficiency in the left lower extremity at 0.86 with digit pressure of 80 mmHg.  Vascular surgery was consulted to evaluate.  The patient is followed by Dr. Blackmon of our service.  He was last seen in 2023 with plans to follow-up in 1 year, he was a no-show for this appointment.  On previous imaging he was found to have a long segment right SFA occlusion with reconstitution of the right popliteal and multivessel runoff to the right foot.  He was recommended medical management with aspirin and a statin along with smoking cessation as he had no critical ischemic symptoms.        Review of Systems   Respiratory:  Positive for shortness of breath.    Neurological:  Positive for weakness.        Past Medical History:   Diagnosis Date    AAA (abdominal aortic aneurysm) without rupture     COPD (chronic obstructive pulmonary disease)     PAD (peripheral artery disease)      Past Surgical History:   Procedure Laterality Date    CARDIAC CATHETERIZATION       History reviewed. No pertinent family history.    Social History     Tobacco Use    Smoking status: Every Day     Types: Cigarettes    Smokeless tobacco: Never   Vaping Use    Vaping status: Never Used   Substance Use Topics    Alcohol use: Defer    Drug use: Defer     (Not in a hospital admission)    allopurinol, 100 mg, Oral, Daily  ampicillin-sulbactam, 3,000 mg, Intravenous, Q6H  aspirin, 81 mg, Oral, Daily  atorvastatin, 10 mg, Oral, Daily  azithromycin, 500 mg, Intravenous, Daily  citalopram, 20 mg, Oral, Daily  enoxaparin, 40 mg, Subcutaneous, Daily  gabapentin, 800 mg, Oral, Q8H  ipratropium-albuterol, 3 mL, Nebulization, Q6H - RT  isosorbide mononitrate, 30 mg, Oral, Daily  pantoprazole, 40 mg, Oral, Q AM  predniSONE, 40 mg, Oral, Daily With Breakfast  sodium chloride, 10 mL, Intravenous, Q12H  tamsulosin, 0.4 mg, Oral, Daily           acetaminophen    albuterol    albuterol    baclofen    senna-docusate sodium **AND** polyethylene glycol **AND** bisacodyl **AND** bisacodyl    Calcium Replacement - Follow Nurse / BPA Driven Protocol    HYDROcodone-acetaminophen    hydrOXYzine    Magnesium Standard Dose Replacement - Follow Nurse / BPA Driven Protocol    meclizine    Morphine **AND** naloxone    ondansetron ODT **OR** ondansetron    Phosphorus Replacement - Follow Nurse / BPA Driven Protocol    Potassium Replacement - Follow Nurse / BPA Driven Protocol    [COMPLETED] Insert Peripheral IV **AND** sodium chloride    sodium chloride    sodium chloride  Patient has no known allergies.    Objective     Physical Exam:   NAD, alert and oriented  RRR  Lungs clear  Abd soft, benign  Vascular: Palpable L DP pulse, brisk biphasic R DP/PT signals  Brawny  "discoloration of bilateral lower legs consistent with chronic edema.  Bilateral lateral heel wounds with no cellulitis, clean bases, no cellulitis.            Vital Signs and Labs:  Vital Signs Patient Vitals for the past 24 hrs:   BP Temp Temp src Pulse Resp SpO2 Height Weight   01/14/25 1533 106/62 97.3 °F (36.3 °C) Temporal 114 16 99 % -- --   01/14/25 1529 -- -- -- 113 16 92 % -- --   01/14/25 1157 (!) 123/101 97 °F (36.1 °C) Temporal 107 12 (!) 88 % -- --   01/14/25 0925 125/73 -- -- 102 -- -- -- --   01/14/25 0807 -- -- -- 83 20 100 % -- --   01/14/25 0803 -- -- -- 87 20 94 % -- --   01/14/25 0729 95/75 97.7 °F (36.5 °C) Temporal 86 16 98 % -- --   01/14/25 0146 -- -- -- 91 20 99 % -- --   01/14/25 0141 123/70 98.1 °F (36.7 °C) Oral 100 20 91 % 167.6 cm (66\") 100 kg (220 lb 7.4 oz)   01/13/25 2317 129/99 -- -- 81 -- 96 % -- --   01/13/25 2316 -- -- -- 102 20 97 % -- --   01/13/25 2312 -- -- -- 81 20 98 % -- --   01/13/25 2301 124/98 -- -- 82 -- 95 % -- --   01/13/25 2143 -- -- -- 81 13 96 % -- --   01/13/25 2140 -- -- -- 82 17 93 % -- --   01/13/25 2030 (!) 150/120 97.9 °F (36.6 °C) -- 87 18 96 % 167.6 cm (66\") --     BMI:  Body mass index is 35.58 kg/m².    CBC    Results from last 7 days   Lab Units 01/14/25  0741 01/13/25 2132   WBC 10*3/mm3 6.47 9.94   HEMOGLOBIN g/dL 13.1 13.9   PLATELETS 10*3/mm3 388 402     BMP   Results from last 7 days   Lab Units 01/14/25  0741 01/13/25 2132   SODIUM mmol/L 135* 136   POTASSIUM mmol/L 5.1 5.5*   CHLORIDE mmol/L 99 98   CO2 mmol/L 23.8 26.9   BUN mg/dL 29* 29*   CREATININE mg/dL 1.60* 1.56*   GLUCOSE mg/dL 123* 82     Coag     HbA1C No results found for: \"HGBA1C\"  Infection     Radiology(recent) US Renal Bilateral    Result Date: 1/14/2025  Impression: Symmetric mild renal atrophy and renal cortical thinning, otherwise no acute finding. Electronically Signed: Quan Escalante MD  1/14/2025 2:49 PM EST  Workstation ID: [id number]    XR Chest 1 View    Result Date: " 1/13/2025  Impression: Low lung volumes. Bilateral perihilar interstitial thickening may be on the basis of interstitial edema. Electronically Signed: Dick Forman MD  1/13/2025 10:11 PM EST  Workstation ID: [id number]     VTE Prophylaxis:  Pharmacologic VTE prophylaxis orders are present.            Active Hospital Problems    Diagnosis  POA    **COPD with acute exacerbation [J44.1]  Unknown    COPD exacerbation [J44.1]  Yes    Acute on chronic respiratory failure with hypoxia [J96.21]  Yes    Acute on chronic hypoxic respiratory failure [J96.21]  Unknown    Hyperkalemia [E87.5]  Unknown    Hyperlipidemia [E78.5]  Yes    Chronic obstructive pulmonary disease [J44.9]  Yes    Obstructive sleep apnea syndrome [G47.33]  Yes    Impairment of balance [R26.89]  Yes    Gastroesophageal reflux disease [K21.9]  Yes    Essential hypertension [I10]  Yes    Depressive disorder [F32.A]  Yes    Benign prostatic hyperplasia [N40.0]  Yes    Generalized anxiety disorder [F41.1]  Yes      Resolved Hospital Problems   No resolved problems to display.       Assessment & Plan   Assessment / Plan     COPD with acute exacerbation    Benign prostatic hyperplasia    Chronic obstructive pulmonary disease    Depressive disorder    Essential hypertension    Gastroesophageal reflux disease    Generalized anxiety disorder    Hyperlipidemia    Impairment of balance    Obstructive sleep apnea syndrome    Acute on chronic hypoxic respiratory failure    Hyperkalemia    COPD exacerbation    Acute on chronic respiratory failure with hypoxia      1.  COPD exacerbation: Patient presents with shortness of breath progressing over the last few days.  He has been started on oral steroids and DuoNebs.  Management per primary team.    2.  PAD: Imaging shows slight progression in peripheral arterial disease in his right lower extremity, VARGHESE at 0.74, left remains stable at 0.86.  He has no rest pain but has some small ulceration that dont really look ischemic  in nature. Perfusion of the left foot is good with a palpable pulse. R leg does have some moderate PAD. Would not recommend intervention for this while he is being treated for his COPD exacerbation. The perfusion is not so severe that it would account for bilateral lower extremity weakness. I suspect this is due to general medical decompensation. He looks rather chronically ill.   Recommend keeping lower extremities warm and protected, continue wound care, and will arrange outpatient follow up in 1 month.       Thank you for this consultation.               KYLEIGH Williamson  [facility]  01/14/25   O: [phone]  F: [phone]

## 2025-01-14 NOTE — THERAPY EVALUATION
Patient Name: Poncho Dalton .  : 1950    MRN: [id number]                              Today's Date: 2025       Admit Date: 2025    Visit Dx:     ICD-10-CM ICD-9-CM   1. Acute respiratory distress  R06.03 518.82   2. Chronic obstructive pulmonary disease with acute exacerbation  J44.1 491.21     Patient Active Problem List   Diagnosis    Benign prostatic hyperplasia    Chronic obstructive pulmonary disease    Depressive disorder    Essential hypertension    Fatigue    Gastroesophageal reflux disease    Generalized anxiety disorder    Gout    Hyperlipidemia    Impairment of balance    Obstructive sleep apnea syndrome    COPD with acute exacerbation    Acute on chronic hypoxic respiratory failure    Hyperkalemia    COPD exacerbation    Acute on chronic respiratory failure with hypoxia     History reviewed. No pertinent past medical history.  Past Surgical History:   Procedure Laterality Date    CARDIAC CATHETERIZATION        General Information       Row Name 25 1225          OT Time and Intention    Document Type evaluation  -DT     Mode of Treatment occupational therapy  -DT       Row Name 25 1225          General Information    Patient Profile Reviewed yes  -DT     Prior Level of Function min assist:;all household mobility;bed mobility;mod assist:;ADL's  Ambulates short distance in home with rollator. Dtr assists all with BADLs. Pt uses 3L O2 at home at all times.  -DT     Existing Precautions/Restrictions fall;oxygen therapy device and L/min  -DT     Barriers to Rehab medically complex;previous functional deficit  -DT       Row Name 25 1225          Living Environment    People in Home child(kodi), adult  Dtr & JL  -DT       Row Name 25 1225          Cognition    Orientation Status (Cognition) oriented x 4  -DT       Row Name 25 1225          Safety Issues/Impairments Affecting Functional Mobility    Impairments Affecting Function (Mobility)  balance;cognition;endurance/activity tolerance;pain;range of motion (ROM);sensation/sensory awareness;strength;shortness of breath;coordination  -DT     Cognitive Impairments, Mobility Safety/Performance insight into deficits/self-awareness;judgment;problem-solving/reasoning  -DT               User Key  (r) = Recorded By, (t) = Taken By, (c) = Cosigned By      Initials Name Provider Type    DT Brenda Bolivar, OT Occupational Therapist                     Mobility/ADL's       Row Name 01/14/25 1228          Bed Mobility    Bed Mobility rolling left;rolling right;supine-sit;sit-supine  -DT     Rolling Left [place] (Bed Mobility) minimum assist (75% patient effort)  -DT     Rolling Right [place] (Bed Mobility) minimum assist (75% patient effort)  -DT     Supine-Sit [place] (Bed Mobility) moderate assist (50% patient effort);2 person assist  -DT     Sit-Supine [place] (Bed Mobility) moderate assist (50% patient effort);2 person assist  -DT     Assistive Device (Bed Mobility) bed rails;repositioning sheet;head of bed elevated  -DT       Row Name 01/14/25 1228          Transfers    Transfers sit-stand transfer;stand-sit transfer  -DT       Row Name 01/14/25 1228          Sit-Stand Transfer    Sit-Stand [place] (Transfers) moderate assist (50% patient effort);2 person assist  -DT       Row Name 01/14/25 1228          Stand-Sit Transfer    Stand-Sit [place] (Transfers) moderate assist (50% patient effort);2 person assist  -DT       Row Name 01/14/25 1228          Functional Mobility    Patient was able to Ambulate no, other medical factors prevent ambulation  -DT     Reason Patient was unable to Ambulate Excessive Weakness  -DT       Row Name 01/14/25 1228          Activities of Daily Living    BADL Assessment/Intervention lower body dressing;feeding;grooming;toileting  -DT       Row Name 01/14/25 1228          Lower Body Dressing Assessment/Training    [place] Level (Lower Body  Dressing) lower body dressing skills;maximum assist (25% patient effort)  -DT       Row Name 01/14/25 1228          Self-Feeding Assessment/Training    [place] Level (Feeding) moderate assist (50% patient effort);set up;liquids to mouth;minimum assist (75% patient effort)  Pt needs food items s/u clost to allow pt to reach as he has  limited shoulder flex & has difficulty lifting heavy cups.  -DT       Row Name 01/14/25 1228          Grooming Assessment/Training    [place] Level (Grooming) hair care, combing/brushing;maximum assist (25% patient effort)  -DT       Row Name 01/14/25 1228          Toileting Assessment/Training    [place] Level (Toileting) perform perineal hygiene;toileting skills;maximum assist (25% patient effort)  -DT     Position (Toileting) supine  -DT               User Key  (r) = Recorded By, (t) = Taken By, (c) = Cosigned By      Initials Name Provider Type    DT Brenda Bolivar, OT Occupational Therapist                   Obj/Interventions       Row Name 01/14/25 1232          Sensory Assessment (Somatosensory)    Sensory Assessment (Somatosensory) bilateral LE  -DT     Sensory Assessment Pt reports dec sensation in bilateral feet.  -DT       Row Name 01/14/25 1232          Vision Assessment/Intervention    Visual Impairment/Limitations WFL  -DT     Vision Assessment Comment Pt has s/s of strabismus,  but pt reports no visual deficits  -DT       Row Name 01/14/25 1232          Range of Motion Comprehensive    General Range of Motion upper extremity range of motion deficits identified  -DT     Comment, General Range of Motion bilateral shoulders AROM limited to approx 45 degree flex. Full elbow & grasp AROM  -DT       Row Name 01/14/25 1232          Strength Comprehensive (MMT)    General Manual Muscle Testing (MMT) Assessment upper extremity strength deficits identified;lower extremity strength deficits identified  -DT       Row Name 01/14/25 1232          Motor Skills     Motor Skills coordination;functional endurance  -DT     Coordination gross motor deficit;lower extremity;other (see comments)  Pt reporting involuntary LE muscle twitiching is new onset.  -DT     Functional Endurance poor+  -DT       Row Name 01/14/25 1232          Balance    Balance Assessment sitting static balance;sitting dynamic balance;standing static balance  -DT     Static Sitting Balance contact guard  -DT     Dynamic Sitting Balance minimal assist  -DT     Position, Sitting Balance sitting edge of bed  -DT     Static Standing Balance moderate assist;2-person assist  -DT               User Key  (r) = Recorded By, (t) = Taken By, (c) = Cosigned By      Initials Name Provider Type    DT Brenda Bolivar, OT Occupational Therapist                   Goals/Plan       Row Name 01/14/25 1247          Bed Mobility Goal 1 (OT)    Activity/Assistive Device (Bed Mobility Goal 1, OT) bed mobility activities, all  -DT     [place] Level/Cues Needed (Bed Mobility Goal 1, OT) minimum assist (75% or more patient effort)  -DT     Time Frame (Bed Mobility Goal 1, OT) 2 weeks  -DT       Row Name 01/14/25 1247          Transfer Goal 1 (OT)    Activity/Assistive Device (Transfer Goal 1, OT) transfers, all  -DT     [place] Level/Cues Needed (Transfer Goal 1, OT) moderate assist (50-74% patient effort)  -DT     Time Frame (Transfer Goal 1, OT) 2 weeks  -DT       Row Name 01/14/25 4709          Strength Goal 1 (OT)    Strength Goal 1 (OT) Inc standing tolerance to 3-5 min & balance to fair using RW to allow for ease of toileting hygiene & LB dressing by caregiver.  -DT     Time Frame (Strength Goal 1, OT) 2 weeks  -DT       Row Name 01/14/25 9546          Therapy Assessment/Plan (OT)    Planned Therapy Interventions (OT) activity tolerance training;BADL retraining;functional balance retraining;neuromuscular control/coordination retraining;occupation/activity based interventions;passive  ROM/stretching;patient/caregiver education/training;ROM/therapeutic exercise;strengthening exercise;transfer/mobility retraining  -DT               User Key  (r) = Recorded By, (t) = Taken By, (c) = Cosigned By      Initials Name Provider Type    DT Brenda Bolivar, OT Occupational Therapist                   Clinical Impression       Row Name 01/14/25 1237          Pain Assessment    Pain Location buttock;foot;other (see comments)  5th digit  -DT     Pain Side/Orientation right  -DT     Additional Documentation Pain Scale: FACES Pre/Post-Treatment (Group)  -DT       Row Name 01/14/25 1237          Pain Scale: FACES Pre/Post-Treatment    Pain: FACES Scale, Pretreatment 2-->hurts little bit  -DT     Posttreatment Pain Rating 4-->hurts little more  -DT       Row Name 01/14/25 1237          Plan of Care Review    Plan of Care Reviewed With patient  -DT     Outcome Evaluation Pt is a 74-year-old male with history of COPD on 3 L of oxygen at home  who presented to ED on 01/13/25 with complains of increasing shortness of breath & cough over the last couple days, inc LE weakness & inability to walk. Pt admitted with COPD exac, acute on chronic hypoxic resp failure, hyperkalemia, LE weakness & STEPHEN. At baseline, pt reports he lives with dtr & JL. His dtr assists him with all BADLs & pt amb short distances using a rollator. He uses 3L O2 at home at all times. Upon eval, pt is A&O X4. He c/o pain in herbie-area from wounds & right foot. He has limited shoulder flex flex/abd AROM & requires assist for UB ADLs, grooming & needs s/u for meals. He needs assist X2 for bed mobility & sit<>stand at EOB.  He requires max A for LB ADLs & pericare. Pt unable to ambulate this date due to LE weakness, dec standing balance, dec LE GMC & generalized dec activity tolerance. Pt is functioning below his baseline status & is at increased risk for falls; therefore, OT will continue to follow for tx & recommends SNF upon discharge.  -DT        Row Name 01/14/25 1237          Therapy Assessment/Plan (OT)    Rehab Potential (OT) good  -DT     Criteria for Skilled Therapeutic Interventions Met (OT) yes;meets criteria;skilled treatment is necessary  -DT     Therapy Frequency (OT) 3 times/wk  -DT     Predicted Duration of Therapy Intervention (OT) until d/c  -DT       Row Name 01/14/25 1237          Therapy Plan Review/Discharge Plan (OT)    Anticipated Discharge Disposition (OT) skilled nursing facility  -DT       Row Name 01/14/25 1237          Vital Signs    Pre SpO2 (%) 94  -DT     O2 Delivery Pre Treatment supplemental O2  4L  -DT     Intra SpO2 (%) 91  -DT     O2 Delivery Intra Treatment supplemental O2  4L  -DT       Row Name 01/14/25 1237          Positioning and Restraints    Pre-Treatment Position in bed  -DT     Post Treatment Position bed  -DT     In Bed notified nsg;supine;call light within reach;encouraged to call for assist;side rails up x2  -DT               User Key  (r) = Recorded By, (t) = Taken By, (c) = Cosigned By      Initials Name Provider Type    Brenda Leach, OT Occupational Therapist                   Outcome Measures       Row Name 01/14/25 0800          How much help from another person do you currently need...    Turning from your back to your side while in flat bed without using bedrails? 2  -TC     Moving from lying on back to sitting on the side of a flat bed without bedrails? 2  -TC     Moving to and from a bed to a chair (including a wheelchair)? 2  -TC     Standing up from a chair using your arms (e.g., wheelchair, bedside chair)? 2  -TC     Climbing 3-5 steps with a railing? 2  -TC     To walk in hospital room? 2  -TC     AM-PAC 6 Clicks Score (PT) 12  -TC               User Key  (r) = Recorded By, (t) = Taken By, (c) = Cosigned By      Initials Name Provider Type    TC Mike Saez, RN Registered Nurse                    Occupational Therapy Education       Title: PT OT SLP Therapies (In Progress)        Topic: Occupational Therapy (In Progress)       Point: ADL training (Done)       Description:   Instruct learner(s) on proper safety adaptation and remediation techniques during self care or transfers.   Instruct in proper use of assistive devices.                  Learning Progress Summary            Patient Acceptance, E,TB, VU by DT at 1/14/2025 1252    Comment: Role of OT,goals & POC                      Point: Home exercise program (Not Started)       Description:   Instruct learner(s) on appropriate technique for monitoring, assisting and/or progressing therapeutic exercises/activities.                  Learner Progress:  Not documented in this visit.              Point: Precautions (Done)       Description:   Instruct learner(s) on prescribed precautions during self-care and functional transfers.                  Learning Progress Summary            Patient Acceptance, E,TB, VU by DT at 1/14/2025 1252    Comment: Role of OT,goals & POC                      Point: Body mechanics (Done)       Description:   Instruct learner(s) on proper positioning and spine alignment during self-care, functional mobility activities and/or exercises.                  Learning Progress Summary            Patient Acceptance, E,TB, VU by DT at 1/14/2025 1252    Comment: Role of OT,goals & POC                                      User Key       Initials Effective Dates Name Provider Type Discipline    DT 07/11/23 -  Brenda Bolivar, OT Occupational Therapist OT                  OT Recommendation and Plan  Planned Therapy Interventions (OT): activity tolerance training, BADL retraining, functional balance retraining, neuromuscular control/coordination retraining, occupation/activity based interventions, passive ROM/stretching, patient/caregiver education/training, ROM/therapeutic exercise, strengthening exercise, transfer/mobility retraining  Therapy Frequency (OT): 3 times/wk  Plan of Care Review  Plan of Care Reviewed  With: patient  Outcome Evaluation: Pt is a 74-year-old male with history of COPD on 3 L of oxygen at home  who presented to ED on 01/13/25 with complains of increasing shortness of breath & cough over the last couple days, inc LE weakness & inability to walk. Pt admitted with COPD exac, acute on chronic hypoxic resp failure, hyperkalemia, LE weakness & STEPHEN. At baseline, pt reports he lives with dtr & JL. His dtr assists him with all BADLs & pt amb short distances using a rollator. He uses 3L O2 at home at all times. Upon eval, pt is A&O X4. He c/o pain in herbie-area from wounds & right foot. He has limited shoulder flex flex/abd AROM & requires assist for UB ADLs, grooming & needs s/u for meals. He needs assist X2 for bed mobility & sit<>stand at EOB.  He requires max A for LB ADLs & pericare. Pt unable to ambulate this date due to LE weakness, dec standing balance, dec LE GMC & generalized dec activity tolerance. Pt is functioning below his baseline status & is at increased risk for falls; therefore, OT will continue to follow for tx & recommends SNF upon discharge.     Time Calculation:         Time Calculation- OT       Row Name 01/14/25 1252             Time Calculation- OT    OT Start Time 0957  -DT      OT Stop Time 1027  -DT      OT Time Calculation (min) 30 min  -DT      OT Received On 01/14/25  -DT      OT - Next Appointment 01/16/25  -DT      OT Goal Re-Cert Due Date 01/28/25  -DT                User Key  (r) = Recorded By, (t) = Taken By, (c) = Cosigned By      Initials Name Provider Type    Brenda Leach, OT Occupational Therapist                           Brenda Bolivar, OT  1/14/2025

## 2025-01-14 NOTE — CASE MANAGEMENT/SOCIAL WORK
Discharge Planning Assessment   [place]     Patient Name: Poncho Dalton Sr.  MRN: [id number]  Today's Date: 1/14/2025    Admit Date: 1/13/2025    Plan: From home, lives with adult daughter. Pending PT/OT lynne   Discharge Needs Assessment       Row Name 01/14/25 1505       Living Environment    People in Home child(kodi), adult    Name(s) of People in Home Regis, daughter    Current Living Arrangements home    Potentially Unsafe Housing Conditions none    In the past 12 months has the electric, gas, oil, or water company threatened to shut off services in your home? No    Primary Care Provided by self    Provides Primary Care For no one    Family Caregiver if Needed child(kodi), adult    Family Caregiver Names Regis    Quality of Family Relationships helpful;involved    Able to Return to Prior Arrangements yes       Resource/Environmental Concerns    Resource/Environmental Concerns none    Transportation Concerns none       Transportation Needs    In the past 12 months, has lack of transportation kept you from medical appointments or from getting medications? no    In the past 12 months, has lack of transportation kept you from meetings, work, or from getting things needed for daily living? No       Food Insecurity    Within the past 12 months, you worried that your food would run out before you got the money to buy more. Never true    Within the past 12 months, the food you bought just didn't last and you didn't have money to get more. Never true       Transition Planning    Patient/Family Anticipates Transition to home with family    Patient/Family Anticipated Services at Transition none    Transportation Anticipated family or friend will provide       Discharge Needs Assessment    Readmission Within the Last 30 Days no previous admission in last 30 days    Equipment Currently Used at Home walker, rolling;oxygen    Concerns to be Addressed denies needs/concerns at this time    Anticipated Changes Related to Illness  none    Equipment Needed After Discharge none                   Discharge Plan       Row Name 01/14/25 1507       Plan    Plan From home, lives with adult daughter. Pending PT/OT evals    Patient/Family in Agreement with Plan yes    Plan Comments CM met with patient at bedside. Confirmed PCP, insurance, and pharmacy.  Meds to beds denied. Patient denies any difficulty affording medications. Patient not current with any therapies. Confirmed transportation at discharge will be his daughter, Lola [place] Barriers: IV steroids, duo-nebs, IV antibiotics, PT/OT.                  Continued Care and Services - Admitted Since 1/13/2025    No active coordination exists for this encounter.          Demographic Summary       Row Name 01/14/25 1504       General Information    Admission Type observation    Arrived From emergency department    Referral Source admission list    Reason for Consult discharge planning    Preferred Language English       Contact Information    Permission Granted to Share Info With                    Functional Status       Row Name 01/14/25 1504       Functional Status    Usual Activity Tolerance moderate    Current Activity Tolerance moderate       Functional Status, IADL    Medications independent    Meal Preparation independent    Housekeeping independent    Laundry independent    Shopping independent    If for any reason you need help with day-to-day activities such as bathing, preparing meals, shopping, managing finances, etc., do you get the help you need? I get all the help I need             Ginny Ku RN     Office: [phone]

## 2025-01-15 ENCOUNTER — APPOINTMENT (OUTPATIENT)
Dept: CARDIOLOGY | Facility: HOSPITAL | Age: 75
DRG: 190 | End: 2025-01-15
Payer: MEDICARE

## 2025-01-15 LAB
ALBUMIN SERPL-MCNC: 3.8 G/DL (ref 3.5–5.2)
ANION GAP SERPL CALCULATED.3IONS-SCNC: 12.8 MMOL/L (ref 5–15)
AV MEAN PRESS GRAD SYS DOP V1V2: 4 MMHG
AV VMAX SYS DOP: 143 CM/SEC
BASOPHILS # BLD AUTO: 0.01 10*3/MM3 (ref 0–0.2)
BASOPHILS NFR BLD AUTO: 0.1 % (ref 0–1.5)
BH CV ECHO MEAS - AO MAX PG: 8.2 MMHG
BH CV ECHO MEAS - AO V2 VTI: 22.9 CM
BH CV ECHO MEAS - AVA(I,D): 2.45 CM2
BH CV ECHO MEAS - EDV(CUBED): 140.6 ML
BH CV ECHO MEAS - EDV(MOD-SP4): 89.6 ML
BH CV ECHO MEAS - EF(MOD-SP4): 60 %
BH CV ECHO MEAS - ESV(CUBED): 39.3 ML
BH CV ECHO MEAS - ESV(MOD-SP4): 35.8 ML
BH CV ECHO MEAS - FS: 34.6 %
BH CV ECHO MEAS - IVS/LVPW: 0.93 CM
BH CV ECHO MEAS - IVSD: 1.3 CM
BH CV ECHO MEAS - LA DIMENSION: 4.8 CM
BH CV ECHO MEAS - LAT PEAK E' VEL: 6 CM/SEC
BH CV ECHO MEAS - LV DIASTOLIC VOL/BSA (35-75): 43 CM2
BH CV ECHO MEAS - LV MASS(C)D: 293.8 GRAMS
BH CV ECHO MEAS - LV MAX PG: 5.2 MMHG
BH CV ECHO MEAS - LV MEAN PG: 3 MMHG
BH CV ECHO MEAS - LV SYSTOLIC VOL/BSA (12-30): 17.2 CM2
BH CV ECHO MEAS - LV V1 MAX: 114 CM/SEC
BH CV ECHO MEAS - LV V1 VTI: 16.2 CM
BH CV ECHO MEAS - LVIDD: 5.2 CM
BH CV ECHO MEAS - LVIDS: 3.4 CM
BH CV ECHO MEAS - LVOT AREA: 3.5 CM2
BH CV ECHO MEAS - LVOT DIAM: 2.1 CM
BH CV ECHO MEAS - LVPWD: 1.4 CM
BH CV ECHO MEAS - MED PEAK E' VEL: 5.8 CM/SEC
BH CV ECHO MEAS - MV A MAX VEL: 99 CM/SEC
BH CV ECHO MEAS - MV DEC SLOPE: 518 CM/SEC2
BH CV ECHO MEAS - MV DEC TIME: 0.18 SEC
BH CV ECHO MEAS - MV E MAX VEL: 56.1 CM/SEC
BH CV ECHO MEAS - MV E/A: 0.57
BH CV ECHO MEAS - MV MAX PG: 5 MMHG
BH CV ECHO MEAS - MV MEAN PG: 2.5 MMHG
BH CV ECHO MEAS - MV P1/2T: 41.5 MSEC
BH CV ECHO MEAS - MV V2 VTI: 21.6 CM
BH CV ECHO MEAS - MVA(P1/2T): 5.3 CM2
BH CV ECHO MEAS - MVA(VTI): 2.6 CM2
BH CV ECHO MEAS - PA ACC TIME: 0.05 SEC
BH CV ECHO MEAS - PA V2 MAX: 90.3 CM/SEC
BH CV ECHO MEAS - RAP SYSTOLE: 8 MMHG
BH CV ECHO MEAS - RV MAX PG: 3.5 MMHG
BH CV ECHO MEAS - RV V1 MAX: 93.8 CM/SEC
BH CV ECHO MEAS - RV V1 VTI: 13.1 CM
BH CV ECHO MEAS - RVDD: 2.8 CM
BH CV ECHO MEAS - RVSP: 23.5 MMHG
BH CV ECHO MEAS - SV(LVOT): 56.1 ML
BH CV ECHO MEAS - SV(MOD-SP4): 53.8 ML
BH CV ECHO MEAS - SVI(LVOT): 26.9 ML/M2
BH CV ECHO MEAS - SVI(MOD-SP4): 25.8 ML/M2
BH CV ECHO MEAS - TR MAX PG: 15.5 MMHG
BH CV ECHO MEAS - TR MAX VEL: 197 CM/SEC
BH CV ECHO MEASUREMENTS AVERAGE E/E' RATIO: 9.51
BILIRUB UR QL STRIP: NEGATIVE
BUN SERPL-MCNC: 35 MG/DL (ref 8–23)
BUN/CREAT SERPL: 22.9 (ref 7–25)
CALCIUM SPEC-SCNC: 9.1 MG/DL (ref 8.6–10.5)
CHLORIDE SERPL-SCNC: 99 MMOL/L (ref 98–107)
CLARITY UR: CLEAR
CO2 SERPL-SCNC: 26.2 MMOL/L (ref 22–29)
COLOR UR: YELLOW
CREAT SERPL-MCNC: 1.53 MG/DL (ref 0.76–1.27)
DEPRECATED RDW RBC AUTO: 49 FL (ref 37–54)
EGFRCR SERPLBLD CKD-EPI 2021: 47.7 ML/MIN/1.73
EOSINOPHIL # BLD AUTO: 0 10*3/MM3 (ref 0–0.4)
EOSINOPHIL NFR BLD AUTO: 0 % (ref 0.3–6.2)
ERYTHROCYTE [DISTWIDTH] IN BLOOD BY AUTOMATED COUNT: 15.3 % (ref 12.3–15.4)
GLUCOSE SERPL-MCNC: 148 MG/DL (ref 65–99)
GLUCOSE UR STRIP-MCNC: NEGATIVE MG/DL
HCT VFR BLD AUTO: 37.1 % (ref 37.5–51)
HGB BLD-MCNC: 12.4 G/DL (ref 13–17.7)
HGB UR QL STRIP.AUTO: NEGATIVE
IMM GRANULOCYTES # BLD AUTO: 0.1 10*3/MM3 (ref 0–0.05)
IMM GRANULOCYTES NFR BLD AUTO: 0.9 % (ref 0–0.5)
KETONES UR QL STRIP: NEGATIVE
LEUKOCYTE ESTERASE UR QL STRIP.AUTO: NEGATIVE
LV EF BIPLANE MOD: 60 %
LYMPHOCYTES # BLD AUTO: 1.52 10*3/MM3 (ref 0.7–3.1)
LYMPHOCYTES NFR BLD AUTO: 13.3 % (ref 19.6–45.3)
MCH RBC QN AUTO: 30.1 PG (ref 26.6–33)
MCHC RBC AUTO-ENTMCNC: 33.4 G/DL (ref 31.5–35.7)
MCV RBC AUTO: 90 FL (ref 79–97)
MONOCYTES # BLD AUTO: 0.97 10*3/MM3 (ref 0.1–0.9)
MONOCYTES NFR BLD AUTO: 8.5 % (ref 5–12)
NEUTROPHILS NFR BLD AUTO: 77.2 % (ref 42.7–76)
NEUTROPHILS NFR BLD AUTO: 8.8 10*3/MM3 (ref 1.7–7)
NITRITE UR QL STRIP: NEGATIVE
NRBC BLD AUTO-RTO: 0 /100 WBC (ref 0–0.2)
PH UR STRIP.AUTO: 6 [PH] (ref 5–8)
PHOSPHATE SERPL-MCNC: 3.6 MG/DL (ref 2.5–4.5)
PLATELET # BLD AUTO: 372 10*3/MM3 (ref 140–450)
PMV BLD AUTO: 8.7 FL (ref 6–12)
POTASSIUM SERPL-SCNC: 4.3 MMOL/L (ref 3.5–5.2)
PROT UR QL STRIP: NEGATIVE
RBC # BLD AUTO: 4.12 10*6/MM3 (ref 4.14–5.8)
SINUS: 3.3 CM
SODIUM SERPL-SCNC: 138 MMOL/L (ref 136–145)
SODIUM UR-SCNC: 72 MMOL/L
SP GR UR STRIP: 1.02 (ref 1–1.03)
UROBILINOGEN UR QL STRIP: NORMAL
WBC NRBC COR # BLD AUTO: 11.4 10*3/MM3 (ref 3.4–10.8)

## 2025-01-15 PROCEDURE — 25010000002 ENOXAPARIN PER 10 MG: Performed by: INTERNAL MEDICINE

## 2025-01-15 PROCEDURE — 80069 RENAL FUNCTION PANEL: CPT | Performed by: INTERNAL MEDICINE

## 2025-01-15 PROCEDURE — 25810000003 SODIUM CHLORIDE 0.9 % SOLUTION 250 ML FLEX CONT: Performed by: FAMILY MEDICINE

## 2025-01-15 PROCEDURE — 85025 COMPLETE CBC W/AUTO DIFF WBC: CPT | Performed by: INTERNAL MEDICINE

## 2025-01-15 PROCEDURE — 63710000001 PREDNISONE PER 1 MG: Performed by: INTERNAL MEDICINE

## 2025-01-15 PROCEDURE — 93306 TTE W/DOPPLER COMPLETE: CPT

## 2025-01-15 PROCEDURE — 94799 UNLISTED PULMONARY SVC/PX: CPT

## 2025-01-15 PROCEDURE — 25810000003 SODIUM CHLORIDE 0.9 % SOLUTION: Performed by: INTERNAL MEDICINE

## 2025-01-15 PROCEDURE — 94761 N-INVAS EAR/PLS OXIMETRY MLT: CPT

## 2025-01-15 PROCEDURE — 25010000002 AMPICILLIN-SULBACTAM PER 1.5 G: Performed by: INTERNAL MEDICINE

## 2025-01-15 PROCEDURE — 81003 URINALYSIS AUTO W/O SCOPE: CPT | Performed by: INTERNAL MEDICINE

## 2025-01-15 PROCEDURE — 93306 TTE W/DOPPLER COMPLETE: CPT | Performed by: INTERNAL MEDICINE

## 2025-01-15 PROCEDURE — 25010000002 AZITHROMYCIN PER 500 MG: Performed by: FAMILY MEDICINE

## 2025-01-15 PROCEDURE — 84300 ASSAY OF URINE SODIUM: CPT | Performed by: INTERNAL MEDICINE

## 2025-01-15 PROCEDURE — 94664 DEMO&/EVAL PT USE INHALER: CPT

## 2025-01-15 PROCEDURE — 25010000002 SULFUR HEXAFLUORIDE MICROSPH 60.7-25 MG RECONSTITUTED SUSPENSION

## 2025-01-15 RX ORDER — AZITHROMYCIN 250 MG/1
500 TABLET, FILM COATED ORAL
Status: COMPLETED | OUTPATIENT
Start: 2025-01-16 | End: 2025-01-17

## 2025-01-15 RX ORDER — SODIUM CHLORIDE 9 MG/ML
50 INJECTION, SOLUTION INTRAVENOUS CONTINUOUS
Status: DISPENSED | OUTPATIENT
Start: 2025-01-15 | End: 2025-01-16

## 2025-01-15 RX ORDER — MINERAL OIL/HYDROPHIL PETROLAT
1 OINTMENT (GRAM) TOPICAL DAILY
Status: DISCONTINUED | OUTPATIENT
Start: 2025-01-15 | End: 2025-01-17 | Stop reason: HOSPADM

## 2025-01-15 RX ADMIN — ATORVASTATIN CALCIUM 10 MG: 10 TABLET ORAL at 09:24

## 2025-01-15 RX ADMIN — AMPICILLIN SODIUM AND SULBACTAM SODIUM 3000 MG: 2; 1 INJECTION, POWDER, FOR SOLUTION INTRAMUSCULAR; INTRAVENOUS at 01:45

## 2025-01-15 RX ADMIN — WHITE PETROLATUM 1 APPLICATION: 1.75 OINTMENT TOPICAL at 19:29

## 2025-01-15 RX ADMIN — AZITHROMYCIN MONOHYDRATE 500 MG: 500 INJECTION, POWDER, LYOPHILIZED, FOR SOLUTION INTRAVENOUS at 09:23

## 2025-01-15 RX ADMIN — PREDNISONE 40 MG: 20 TABLET ORAL at 09:24

## 2025-01-15 RX ADMIN — PANTOPRAZOLE SODIUM 40 MG: 40 TABLET, DELAYED RELEASE ORAL at 06:02

## 2025-01-15 RX ADMIN — Medication 10 ML: at 09:24

## 2025-01-15 RX ADMIN — Medication 10 ML: at 22:38

## 2025-01-15 RX ADMIN — CITALOPRAM 20 MG: 20 TABLET, FILM COATED ORAL at 09:24

## 2025-01-15 RX ADMIN — GABAPENTIN 800 MG: 400 CAPSULE ORAL at 15:08

## 2025-01-15 RX ADMIN — TAMSULOSIN HYDROCHLORIDE 0.4 MG: 0.4 CAPSULE ORAL at 09:23

## 2025-01-15 RX ADMIN — IPRATROPIUM BROMIDE AND ALBUTEROL SULFATE 3 ML: .5; 3 SOLUTION RESPIRATORY (INHALATION) at 06:26

## 2025-01-15 RX ADMIN — SODIUM CHLORIDE 50 ML/HR: 9 INJECTION, SOLUTION INTRAVENOUS at 15:09

## 2025-01-15 RX ADMIN — SULFUR HEXAFLUORIDE 3 ML: KIT at 02:18

## 2025-01-15 RX ADMIN — AMPICILLIN SODIUM AND SULBACTAM SODIUM 3000 MG: 2; 1 INJECTION, POWDER, FOR SOLUTION INTRAMUSCULAR; INTRAVENOUS at 19:29

## 2025-01-15 RX ADMIN — IPRATROPIUM BROMIDE AND ALBUTEROL SULFATE 3 ML: .5; 3 SOLUTION RESPIRATORY (INHALATION) at 11:58

## 2025-01-15 RX ADMIN — IPRATROPIUM BROMIDE AND ALBUTEROL SULFATE 3 ML: .5; 3 SOLUTION RESPIRATORY (INHALATION) at 18:30

## 2025-01-15 RX ADMIN — ISOSORBIDE MONONITRATE 30 MG: 30 TABLET, EXTENDED RELEASE ORAL at 09:24

## 2025-01-15 RX ADMIN — AMPICILLIN SODIUM AND SULBACTAM SODIUM 3000 MG: 2; 1 INJECTION, POWDER, FOR SOLUTION INTRAMUSCULAR; INTRAVENOUS at 09:22

## 2025-01-15 RX ADMIN — ASPIRIN 81 MG: 81 TABLET, COATED ORAL at 09:23

## 2025-01-15 RX ADMIN — GABAPENTIN 800 MG: 400 CAPSULE ORAL at 22:38

## 2025-01-15 RX ADMIN — ENOXAPARIN SODIUM 40 MG: 100 INJECTION SUBCUTANEOUS at 17:09

## 2025-01-15 RX ADMIN — SENNOSIDES AND DOCUSATE SODIUM 2 TABLET: 50; 8.6 TABLET ORAL at 17:14

## 2025-01-15 RX ADMIN — AMPICILLIN SODIUM AND SULBACTAM SODIUM 3000 MG: 2; 1 INJECTION, POWDER, FOR SOLUTION INTRAMUSCULAR; INTRAVENOUS at 15:08

## 2025-01-15 RX ADMIN — ALLOPURINOL 100 MG: 100 TABLET ORAL at 09:24

## 2025-01-15 RX ADMIN — GABAPENTIN 800 MG: 400 CAPSULE ORAL at 06:02

## 2025-01-15 RX ADMIN — HYDROCODONE BITARTRATE AND ACETAMINOPHEN 1 TABLET: 10; 325 TABLET ORAL at 01:45

## 2025-01-15 RX ADMIN — HYDROCODONE BITARTRATE AND ACETAMINOPHEN 1 TABLET: 10; 325 TABLET ORAL at 19:28

## 2025-01-15 NOTE — NURSING NOTE
WOCN note:    74 yr old male admitted 1/13/25 with COPD exacerbation. WOCN consult received for bilateral foot wounds. Patient is current with [facility] wound center and was evaluated by vascular surgery for PAD.     Patient also has skin changes consistent with chronic venous stasis dermatitis with dry flaky skin and hemosiderin staining. He presents with a full thickness wound to his right lateral hind foot that measures approximately 4 x 1.5 cm. There is a small amount of serous exudate on the old dressing. The wound base is pink and clean. There is another wound to the left lateral heel measuring about 0.8 x 0.4 cm also with a small amount of serous exudate and a pink wound base.     There are currently silicone foam dressings in place to both wounds as well as foam off loading boots. Recommend to continue current wound care and will add Aquaphor for skin hydration. Patient to continue follow up with the wound center and vascular surgery after discharge. We will follow as needed.

## 2025-01-15 NOTE — PLAN OF CARE
Problem: Adult Inpatient Plan of Care  Goal: Absence of Hospital-Acquired Illness or Injury  Intervention: Identify and Manage Fall Risk  Description: Perform standard risk assessment on admission using a validated tool or comprehensive approach appropriate to the patient; reassess fall risk frequently, with change in status or transfer to another level of care.  Communicate risk to interprofessional healthcare team; ensure fall risk visible cue.  Determine need for increased observation, equipment and environmental modification, as well as use of supportive, nonskid footwear.  Adjust safety measures to individual needs and identified risk factors.  Reinforce the importance of active participation with fall risk prevention, safety, and physical activity with the patient and family.  Perform regular intentional rounding to assess need for position change, pain assessment and personal needs, including assistance with toileting.  Recent Flowsheet Documentation  Taken 1/15/2025 0000 by Cristino Shannon LPN  Safety Promotion/Fall Prevention:   safety round/check completed   room organization consistent   muscle strengthening facilitated   nonskid shoes/slippers when out of bed   mobility aid in reach   lighting adjusted   fall prevention program maintained   clutter free environment maintained   assistive device/personal items within reach   activity supervised  Taken 1/14/2025 2200 by Cristino Shannon LPN  Safety Promotion/Fall Prevention:   safety round/check completed   room organization consistent   nonskid shoes/slippers when out of bed   muscle strengthening facilitated   mobility aid in reach   lighting adjusted   fall prevention program maintained   clutter free environment maintained   assistive device/personal items within reach   activity supervised  Taken 1/14/2025 2030 by Cristino Shannon LPN  Safety Promotion/Fall Prevention:   safety round/check completed   room organization consistent    nonskid shoes/slippers when out of bed   mobility aid in reach   muscle strengthening facilitated   lighting adjusted   fall prevention program maintained   clutter free environment maintained   assistive device/personal items within reach   activity supervised  Intervention: Prevent Skin Injury  Description: Perform a screening for skin injury risk, such as pressure or moisture-associated skin damage on admission and at regular intervals throughout hospital stay.  Keep all areas of skin (especially folds) clean and dry.  Maintain adequate skin hydration.  Relieve and redistribute pressure and protect bony prominences and skin at risk for injury; implement measures based on patient-specific risk factors.  Match turning and repositioning schedule to clinical condition.  Encourage weight shift frequently; assist with reposition if unable to complete independently.  Float heels off bed; avoid pressure on the Achilles tendon.  Keep skin free from extended contact with medical devices.  Optimize nutrition and hydration.  Encourage functional activity and mobility, as early as tolerated.  Use aids (e.g., slide boards, mechanical lift) during transfer.  Recent Flowsheet Documentation  Taken 1/15/2025 0000 by Cristino Shannon LPN  Body Position: weight shifting  Taken 1/14/2025 2200 by Cristino Shannon LPN  Body Position: weight shifting  Taken 1/14/2025 2030 by Cristino Shannon LPN  Body Position: weight shifting  Intervention: Prevent and Manage VTE (Venous Thromboembolism) Risk  Description: Assess for VTE (venous thromboembolism) risk.  Promote early mobilization; encourage both active and passive leg exercises, if unable to ambulate.  Initiate and maintain compression or other therapy, as indicated, based on identified risk in accordance with organizational protocol and provider order.  Recognize the patient's individual risk for bleeding before initiating pharmacologic thromboprophylaxis.  Recent Flowsheet  Documentation  Taken 1/15/2025 0000 by Cristino Shannon LPN  VTE Prevention/Management:   patient refused intervention   SCDs (sequential compression devices) off  Taken 1/14/2025 2030 by Cristino hSannon LPN  VTE Prevention/Management:   patient refused intervention   SCDs (sequential compression devices) off  Intervention: Prevent Infection  Description: Maintain skin and mucous membrane integrity; promote hand, oral and pulmonary hygiene.  Optimize fluid balance, nutrition, sleep and glycemic control to maximize infection resistance.  Identify potential sources of infection early to prevent or mitigate progression of infection (e.g., wound, lines, devices).  Evaluate ongoing need for invasive devices; remove promptly when no longer indicated.  Review vaccination status.  Recent Flowsheet Documentation  Taken 1/15/2025 0000 by Cristino Shannon LPN  Infection Prevention:   visitors restricted/screened   single patient room provided   rest/sleep promoted   personal protective equipment utilized   hand hygiene promoted  Taken 1/14/2025 2200 by Cristino Shannon LPN  Infection Prevention:   visitors restricted/screened   single patient room provided   rest/sleep promoted   personal protective equipment utilized   hand hygiene promoted  Taken 1/14/2025 2030 by Cristino Shannon LPN  Infection Prevention:   visitors restricted/screened   single patient room provided   rest/sleep promoted   personal protective equipment utilized   hand hygiene promoted  Goal: Optimal Comfort and Wellbeing  Intervention: Monitor Pain and Promote Comfort  Description: Assess pain level, treatment efficacy and patient response at regular intervals using a consistent pain scale.  Consider the presence and impact of preexisting chronic pain.  Encourage patient and caregiver involvement in pain assessment, interventions and safety measures.  Promote activity; balance with sleep and rest to enhance healing.  Recent Flowsheet  Documentation  Taken 1/15/2025 0000 by Cristino Shannon LPN  Pain Management Interventions:   position adjusted   pain management plan reviewed with patient/caregiver   medication offered but refused  Taken 1/14/2025 2030 by Cristino Shannon LPN  Pain Management Interventions:   position adjusted   pain management plan reviewed with patient/caregiver   medication offered but refused   Goal Outcome Evaluation:Plan of care reviewed , will monitor pt progress toward goal cont to monitor

## 2025-01-15 NOTE — CONSULTS
COPD Education  COPD Navigator  Discharge Planning    Patient Name:Poncho Dalton Sr.  :1950  Pulmonologist: None  Current Admission Date: 2025   Previous Admission: None  Admission frequency: 0 admissions in 6 months      Symptoms on admission:Increased SOA over last couple of days, weak legs, chills, dizziness and coughing.       Smoking History: Current    Current Home Medications:  Prior to Admission medications    Medication Sig Start Date End Date Taking? Authorizing Provider   albuterol sulfate  (90 Base) MCG/ACT inhaler  5/3/22  Yes Shad Alvarado MD   allopurinol (ZYLOPRIM) 100 MG tablet Take 1 tablet by mouth.   Yes Shad Alvarado MD   aspirin (aspirin) 81 MG EC tablet Take 1 tablet by mouth Daily. 22  Yes Adan Lutz MD   baclofen (LIORESAL) 20 MG tablet Take 1 tablet by mouth 3 (Three) Times a Day.   Yes Shad Alvarado MD   Calcium Carbonate-Vitamin D (Oyster Shell Calcium/D) 250-125 MG-UNIT tablet Take 1 tablet by mouth Daily.   Yes Shad Alvarado MD   cholecalciferol (VITAMIN D3) 25 MCG (1000 UT) tablet Take 1 tablet by mouth Daily.   Yes Shad Alvarado MD   citalopram (CeleXA) 40 MG tablet Take 1 tablet by mouth Daily.   Yes Shad Alvarado MD   DULoxetine (CYMBALTA) 60 MG capsule Take 1 capsule by mouth Daily.   Yes Shad Alvarado MD   gabapentin (NEURONTIN) 800 MG tablet Take 1 tablet by mouth 3 (Three) Times a Day.   Yes Shad Alvarado MD   HYDROcodone-acetaminophen (NORCO)  MG per tablet Take 1 tablet by mouth Every 6 (Six) Hours As Needed.   Yes Shad Alvarado MD   isosorbide mononitrate (IMDUR) 30 MG 24 hr tablet TAKE ONE TABLET BY MOUTH EVERY MORNING 23  Yes Adan Lutz MD   meloxicam (MOBIC) 15 MG tablet Take 1 tablet by mouth Daily.   Yes Shad Alvarado MD   nitroglycerin (NITROSTAT) 0.4 MG SL tablet  5/3/22  Yes Shad Alvarado MD   omeprazole (priLOSEC)  20 MG capsule Take 1 capsule by mouth Daily.   Yes Shad Alvarado MD   pravastatin (PRAVACHOL) 40 MG tablet Take 1 tablet by mouth Daily.   Yes Shad Alvarado MD   tamsulosin (FLOMAX) 0.4 MG capsule 24 hr capsule Take 1 capsule by mouth Daily.   Yes Shad Alvarado MD   meclizine (ANTIVERT) 25 MG tablet Take 1 tablet by mouth 3 (Three) Times a Day As Needed.    Shad Alvarado MD   sulfamethoxazole-trimethoprim (BACTRIM DS,SEPTRA DS) 800-160 MG per tablet Take 1 tablet by mouth 2 (Two) Times a Day. 1/14/25 1/24/25  ProviderShad MD       Social history:   Lives at home with adult daughter.     Diagnostics Testing:  XR CHEST 1 VW     Date of Exam: 1/13/2025 10:07 PM EST     Indication: Short of breath     Comparison: Noncontrast chest CT performed on January 6, 2020     Findings:  Lung volumes are low. Bilateral perihilar interstitial thickening is visualized. No focal consolidation or effusion. There is no evidence of pneumothorax. Calcified left hilar lymph nodes are visualized. Cardiac silhouette is enlarged. No acute osseous   abnormality identified.     IMPRESSION:  Impression:  Low lung volumes. Bilateral perihilar interstitial thickening may be on the basis of interstitial edema.    Last PFT/when/where? No PFTs  FVC:   FEV1:   FEV1/FVC:   Classification of Airflow Limitation Severity in COPD (Based on Post-Bronchodilator FEV1)  Gold 1: Mild FEV1 >= 80% predicted   Gold 2:  Moderate 50% <= FEV1 < 80% predicted   Gold 3: Severe 30% >= FEV1 < 50% predicted   Gold 4: Very Severe FEV1 < 30% predicted     Patient's Last ABG:  Site   Date Value Ref Range Status   01/13/2025 Left Radial  Final     Milton's Test   Date Value Ref Range Status   01/13/2025 Positive  Final     pH, Arterial   Date Value Ref Range Status   01/13/2025 7.473 (H) 7.350 - 7.450 pH units Final     pCO2, Arterial   Date Value Ref Range Status   01/13/2025 36.1 35.0 - 48.0 mm Hg Final     pO2, Arterial   Date Value  Ref Range Status   01/13/2025 96.5 83.0 - 108.0 mm Hg Final     HCO3, Arterial   Date Value Ref Range Status   01/13/2025 26.5 21.0 - 28.0 mmol/L Final     Base Excess, Arterial   Date Value Ref Range Status   01/13/2025 3.0 0.0 - 3.0 mmol/L Final     Comment:     Serial Number: [id number]:  [id number]     O2 Saturation, Arterial   Date Value Ref Range Status   01/13/2025 98.0 94.0 - 98.0 % Final     CO2 Content   Date Value Ref Range Status   01/13/2025 27.6 22 - 29 mmol/L Final     Barometric Pressure for Blood Gas   Date Value Ref Range Status   01/13/2025   Final     Comment:     N/A     Modality   Date Value Ref Range Status   01/13/2025 Cannula  Final     FIO2   Date Value Ref Range Status   01/13/2025 40 % Final        Patient Assessment:   Patient was lying in bed. Resp even and unlabored. Patient has no complaints at this time.     SpO2 SpO2: (!) 88 % (01/15/25 1318)  Device Device (Oxygen Therapy): nasal cannula (01/15/25 1205)  Flow Flow (L/min) (Oxygen Therapy): 2 (01/15/25 1205)  Home NIPPV Compliance: Unsure  Home Equipment Used: Bipap and 3L NC       Low-Dose CT Lung Cancer Screening  Smoker > 20 PY YES   Age > 50 YES   Smoker quit within the last 15 years. YES     Action Plan:  PFT YES  According to 2024 guidelines, the gold standard for diagnosing COPD is spirometry.    Pulmonary Rehab YES   Follow up in Pulmonary Clinic YES   Smoking Cessation     YES   Low-Dose CT Cancer Screening                                                YES    Acceptance of learning: Acceptance    Identified needs/barriers:   Recommend PFT  Smoking Cessation  Establish pulmonologist- Will send over referral for pulmonary clinic.    Traci Beaver, RRT  COPD Navigator  01/15/25  14:30 EST

## 2025-01-15 NOTE — PROGRESS NOTES
[facility] Progress Note      Patient Name: Poncho Dalton Sr.  : 1950  MRN: [id number]  Primary Care Physician:  Ronaldo Alvarez MD  Date of admission: 2025    Subjective     Interval History:     Patient resting comfortably.  Feeling little better  Dyspnea improving    Review of Systems:   As noted above    Objective     Vitals:   Temp:  [97.3 °F (36.3 °C)-98.9 °F (37.2 °C)] 98.5 °F (36.9 °C)  Heart Rate:  [] 96  Resp:  [14-20] 20  BP: (106-143)/(62-88) 128/79  Flow (L/min) (Oxygen Therapy):  [2-4] 2    Intake/Output Summary (Last 24 hours) at 1/15/2025 1203  Last data filed at 1/15/2025 1130  Gross per 24 hour   Intake 680 ml   Output --   Net 680 ml       Physical Exam:      General Appearance: Chronically ill-appearing, NAD  Skin: warm and dry  HEENT: oral mucosa normal, nonicteric sclera  Neck: supple, no JVD  Lungs: Decreased breath sound bases.  Few scattered wheezes bilaterally  Heart: RRR, normal S1 and S2  Abdomen: soft, nontender, nondistended  : no palpable bladder  Extremities: Trace bilateral lower extremities edema with venous stasis changes  Neuro: normal speech and mental status     Scheduled Meds:     allopurinol, 100 mg, Oral, Daily  ampicillin-sulbactam, 3,000 mg, Intravenous, Q6H  aspirin, 81 mg, Oral, Daily  atorvastatin, 10 mg, Oral, Daily  azithromycin, 500 mg, Intravenous, Daily  citalopram, 20 mg, Oral, Daily  enoxaparin, 40 mg, Subcutaneous, Daily  gabapentin, 800 mg, Oral, Q8H  ipratropium-albuterol, 3 mL, Nebulization, Q6H - RT  isosorbide mononitrate, 30 mg, Oral, Daily  pantoprazole, 40 mg, Oral, Q AM  predniSONE, 40 mg, Oral, Daily With Breakfast  sodium chloride, 10 mL, Intravenous, Q12H  tamsulosin, 0.4 mg, Oral, Daily      IV Meds:        Results Reviewed:   I have personally reviewed the results from the time of this admission to 1/15/2025 12:03 EST     Results from last 7 days   Lab Units 01/15/25  0033 25  0741  01/13/25  2132   SODIUM mmol/L 138 135* 136   POTASSIUM mmol/L 4.3 5.1 5.5*   CHLORIDE mmol/L 99 99 98   CO2 mmol/L 26.2 23.8 26.9   BUN mg/dL 35* 29* 29*   CREATININE mg/dL 1.53* 1.60* 1.56*   CALCIUM mg/dL 9.1 9.2 9.6   BILIRUBIN mg/dL  --   --  0.3   ALK PHOS U/L  --   --  110   ALT (SGPT) U/L  --   --  15   AST (SGOT) U/L  --   --  32   GLUCOSE mg/dL 148* 123* 82     Estimated Creatinine Clearance: 47.6 mL/min (A) (by C-G formula based on SCr of 1.53 mg/dL (H)).  Results from last 7 days   Lab Units 01/15/25  0033   PHOSPHORUS mg/dL 3.6         Results from last 7 days   Lab Units 01/15/25  0033 01/14/25  0741 01/13/25  2132   WBC 10*3/mm3 11.40* 6.47 9.94   HEMOGLOBIN g/dL 12.4* 13.1 13.9   PLATELETS 10*3/mm3 372 388 402           Assessment / Plan     ASSESSMENT:    Acute kidney injury.  Seems to be prerenal in etiology related to COPD exacerbation.  Creatinine slightly better at 1.5 Mg/DL.  Mucous membranes dry has peripheral edema.  Hyperkalemia.  Mild.  Improved  COPD exacerbation.  Acute on chronic hypoxic respiratory failure.  Patient is on antibiotics and steroids  History of BPH.  On tamsulosin.  No significant obstructive symptoms  Benign essential hypertension.  Pressure well-controlled    PLAN:  Gentle IV hydration  Keep off NSAIDs  Repeat labs in a.m.    Juan Balderas MD  01/15/25  12:03 [facility]    [facility]\""  [phone]

## 2025-01-15 NOTE — CONSULTS
"Nutrition Services    Patient Name: Poncho Dalton .  YOB: 1950  MRN: [id number]  Admission date: 1/13/2025    RD to add Boost Original BID (Provides 480 kcals, 20 g protein if consumed)     NUTRITION SCREENING      Trending Narrative: 1/15: Nutrition screening r/t multiple wounds documented. Pt presented to ED on 1/13 via EMS with complaints of increasing SOB over the last couple of days. Pt has a history of COPD with 3L O2 at baseline. Pt admitted r/t COPD exacerbation with increased O2 demands. Pt continues on 5L O2. Pt is able to feed himself with some assistance. Pt has multiple documented wounds. Pt is not appropriate for Skinny at this time due to elevated renal function labs. RD to add ONS to supplement po intake.        PO Diet: Diet: Cardiac; Healthy Heart (2-3 Na+); Fluid Consistency: Thin (IDDSI 0)   PO Supplements:    Trending PO Intake:  1/15: No meals documented since admission (x 2 days)        Nutritionally-Pertinent Medications RDN Reviewed, C/W clinical course         Labs (reviewed below): Reviewed. Management per attending.      Results from last 7 days   Lab Units 01/15/25  0033 01/14/25  0741 01/13/25  2132   SODIUM mmol/L 138 135* 136   POTASSIUM mmol/L 4.3 5.1 5.5*   CHLORIDE mmol/L 99 99 98   CO2 mmol/L 26.2 23.8 26.9   BUN mg/dL 35* 29* 29*   CREATININE mg/dL 1.53* 1.60* 1.56*   CALCIUM mg/dL 9.1 9.2 9.6   BILIRUBIN mg/dL  --   --  0.3   ALK PHOS U/L  --   --  110   ALT (SGPT) U/L  --   --  15   AST (SGOT) U/L  --   --  32   GLUCOSE mg/dL 148* 123* 82     Results from last 7 days   Lab Units 01/15/25  0033   PHOSPHORUS mg/dL 3.6   HEMOGLOBIN g/dL 12.4*   HEMATOCRIT % 37.1*     No results found for: \"HGBA1C\"       GI Function:  No BM documented since admission (x last 2 days)        Skin: Stage 2 - R anterior ankle  Stage 2 - L anterior ankle  MASD - gluteal  Stage 1 - L posterior heel  Multiple arm wounds  Stage 1 - R heel        Weight Review: Estimated body mass index is " "35.51 kg/m² as calculated from the following:    Height as of this encounter: 167.6 cm (66\").    Weight as of this encounter: 99.8 kg (220 lb).    Comment:   1/15: 220#  No recent weight history available    Wt Readings from Last 30 Encounters:   01/15/25 0007 99.8 kg (220 lb)   01/14/25 0141 100 kg (220 lb 7.4 oz)   10/19/23 1203 108 kg (238 lb)   07/29/22 0904 108 kg (237 lb)   07/29/22 1151 108 kg (237 lb)   07/08/22 1029 108 kg (237 lb)          --  Protein Requirements    EST Needs, Method, Wt used  g PRO (1.2-2.0 g/kg IBW 63.8 kg)            Nutrition Problem Statement: Increased nutrient needs (kcal/protein) related to healing as evidenced by multiple documented wounds.             Nutrition Intervention: RD to add Boost Original BID (Provides 480 kcals, 20 g protein if consumed)     Continue to encourage good po intake.           Monitoring/Evaluation Per protocol, I&O, PO intake, Supplement intake, Pertinent labs, Weight, Skin status, GI status, Symptoms, Swallow function, Hemodynamic stability            RD to follow up per protocol.    Electronically signed by:  Vania Jamison RD  01/15/25 09:31 EST      "

## 2025-01-15 NOTE — PLAN OF CARE
Goal Outcome Evaluation:         Patient alert and oriented. Voices needs. No complaints of pain or discomfort. Antibiotics continued. Episode of tachycardia this shift that resolved itself within minutes. NP on unit and aware. No new orders and no further episodes.

## 2025-01-15 NOTE — PROGRESS NOTES
[facility] MEDICINE SERVICE  DAILY PROGRESS NOTE    NAME: Poncho Dalton Sr.  : 1950  MRN: [id number]      LOS: 0 days     PROVIDER OF SERVICE: KYLEIGH Ocampo    Chief Complaint: COPD with acute exacerbation    Subjective:     Interval History:  History taken from: patient    Patient otherwise doing well.  Reports he is feeling better.  Asked that we speak with his daughter over the phone, Regis.  Lengthy discussion with patient regarding disposition to a skilled nursing facility, patient agreeable would like to discuss with patient daughter.  Spoke with patient daughter over the phone who desired [facility] to go to rehab at [facility] if possible.  Inform daughter I would discussed with case management however with physical therapy recommendation of skilled nursing facility unsure if pain is possible.  Patient daughter next choice would be [facility].  Case management informed of this conversation and placement is in progress.        Review of Systems:   Review of Systems   Respiratory:  Negative for shortness of breath.    Cardiovascular:  Negative for chest pain.   Neurological:  Negative for dizziness and headaches.       Objective:     Vital Signs  Temp:  [97 °F (36.1 °C)-98.9 °F (37.2 °C)] 98.1 °F (36.7 °C)  Heart Rate:  [] 86  Resp:  [12-19] 14  BP: (106-143)/() 143/88  Flow (L/min) (Oxygen Therapy):  [4] 4   Body mass index is 35.51 kg/m².    Physical Exam  Physical Exam  General: 73 yo male, Alert and oriented, obese, no acute distress.  HENT: Normocephalic, normal hearing, moist oral mucosa, no scleral icterus.  Neck: Supple, nontender, no carotid bruits, no JVD, no LAD.  Lungs: nonlabored respiration.  Heart: RRR.  Abdomen: Soft, nontender, nondistended.  Musculoskeletal: Normal range of motion and strength, no tenderness or swelling.  Skin: Skin is warm, dry and pink, no rashes or lesions.  Psychiatric: Cooperative, appropriate mood and affect.       Diagnostic Data    Results from  last 7 days   Lab Units 01/15/25  0033 01/14/25  0741 01/13/25  2132   WBC 10*3/mm3 11.40*   < > 9.94   HEMOGLOBIN g/dL 12.4*   < > 13.9   HEMATOCRIT % 37.1*   < > 42.6   PLATELETS 10*3/mm3 372   < > 402   GLUCOSE mg/dL 148*   < > 82   CREATININE mg/dL 1.53*   < > 1.56*   BUN mg/dL 35*   < > 29*   SODIUM mmol/L 138   < > 136   POTASSIUM mmol/L 4.3   < > 5.5*   AST (SGOT) U/L  --   --  32   ALT (SGPT) U/L  --   --  15   ALK PHOS U/L  --   --  110   BILIRUBIN mg/dL  --   --  0.3   ANION GAP mmol/L 12.8   < > 11.1    < > = values in this interval not displayed.       US Renal Bilateral    Result Date: 1/14/2025  Impression: Symmetric mild renal atrophy and renal cortical thinning, otherwise no acute finding. Electronically Signed: Quan Escalante MD  1/14/2025 2:49 PM EST  Workstation ID: [id number]    XR Chest 1 View    Result Date: 1/13/2025  Impression: Low lung volumes. Bilateral perihilar interstitial thickening may be on the basis of interstitial edema. Electronically Signed: Dick Forman MD  1/13/2025 10:11 PM EST  Workstation ID: [id number]       I reviewed the patient's new clinical results.    Assessment/Plan:     Active and Resolved Problems  Active Hospital Problems    Diagnosis  POA    **COPD with acute exacerbation [J44.1]  Unknown    COPD exacerbation [J44.1]  Yes    Acute on chronic respiratory failure with hypoxia [J96.21]  Yes    Acute on chronic hypoxic respiratory failure [J96.21]  Unknown    Hyperkalemia [E87.5]  Unknown    Hyperlipidemia [E78.5]  Yes    Chronic obstructive pulmonary disease [J44.9]  Yes    Obstructive sleep apnea syndrome [G47.33]  Yes    Impairment of balance [R26.89]  Yes    Gastroesophageal reflux disease [K21.9]  Yes    Essential hypertension [I10]  Yes    Depressive disorder [F32.A]  Yes    Benign prostatic hyperplasia [N40.0]  Yes    Generalized anxiety disorder [F41.1]  Yes      Resolved Hospital Problems   No resolved problems to display.       COPD exacerbation with acute  "on chronic hypoxic respiratory failure [3 L baseline]  -Chest x-ray showed bilateral perihilar interstitial thickening may be on the basis of interstitial edema.  TTE and BNP ordered  -Scheduled DuoNebs every 6 hours, prednisone 40 mg daily, azithromycin, Unasyn  -Pulmonary toileting  -Maintain SpO2 greater than 88% in a COPD patient     Lower extremity numbness and weakness with history of falling, likely signs of PAD  -PT/OT recommending SNF, patient and daughter Regis agreeable to this disposition  -ABIs lower extremity bilaterally moderately reduced  -Vascular surgery consulted, see note. Per Dr. Espinal, \"Recommend keeping lower extremities warm and protected, continue wound care, and will arrange outpatient follow up in 1 month\".      Hyperkalemia- resolved  -Lasix 20 mg x 1 done yesterday   -K+ today 4.3     STEPHEN versus CKD, unclear at this time  -Continue to trend creatinine, avoid nephrotoxic medications as able.  -Renal ultrasound showed \"Symmetric mild renal atrophy and renal cortical thinning\"  -Nephrology consulted and following     Chronic pain-continue home Norco, gabapentin, as needed baclofen holding.  Meloxicam in the setting of poor kidney function.     ELVIS-CPAP/BiPAP ordered     GERD-PPI     HTN-restart hypertensive medication      BPH-tamsulosin     SHEILA-SSRI restart     Gout-allopurinol     HLD-statin       VTE Prophylaxis:  Pharmacologic VTE prophylaxis orders are present.             Disposition Planning:     Barriers to Discharge: Placement, continued care  Anticipated Date of Discharge: 1-  Place of Discharge: SNF      Time: 35 minutes     Code Status and Medical Interventions: CPR (Attempt to Resuscitate); Full Support   Ordered at: 01/14/25 0000     Level Of Support Discussed With:    Patient     Code Status (Patient has no pulse and is not breathing):    CPR (Attempt to Resuscitate)     Medical Interventions (Patient has pulse or is breathing):    Full Support       Signature: " Electronically signed by KYLEIGH Ocampo, 01/15/25, 10:07 EST.  [facility] Hospitalist Team

## 2025-01-16 LAB
ALBUMIN SERPL-MCNC: 3.7 G/DL (ref 3.5–5.2)
ANION GAP SERPL CALCULATED.3IONS-SCNC: 10.1 MMOL/L (ref 5–15)
BASOPHILS # BLD AUTO: 0.01 10*3/MM3 (ref 0–0.2)
BASOPHILS NFR BLD AUTO: 0.1 % (ref 0–1.5)
BUN SERPL-MCNC: 21 MG/DL (ref 8–23)
BUN/CREAT SERPL: 18.9 (ref 7–25)
CALCIUM SPEC-SCNC: 9 MG/DL (ref 8.6–10.5)
CHLORIDE SERPL-SCNC: 100 MMOL/L (ref 98–107)
CO2 SERPL-SCNC: 25.9 MMOL/L (ref 22–29)
CREAT SERPL-MCNC: 1.11 MG/DL (ref 0.76–1.27)
DEPRECATED RDW RBC AUTO: 52 FL (ref 37–54)
EGFRCR SERPLBLD CKD-EPI 2021: 70.1 ML/MIN/1.73
EOSINOPHIL # BLD AUTO: 0 10*3/MM3 (ref 0–0.4)
EOSINOPHIL NFR BLD AUTO: 0 % (ref 0.3–6.2)
ERYTHROCYTE [DISTWIDTH] IN BLOOD BY AUTOMATED COUNT: 15.6 % (ref 12.3–15.4)
GLUCOSE SERPL-MCNC: 110 MG/DL (ref 65–99)
HCT VFR BLD AUTO: 39.3 % (ref 37.5–51)
HGB BLD-MCNC: 12.6 G/DL (ref 13–17.7)
IMM GRANULOCYTES # BLD AUTO: 0.06 10*3/MM3 (ref 0–0.05)
IMM GRANULOCYTES NFR BLD AUTO: 0.5 % (ref 0–0.5)
LYMPHOCYTES # BLD AUTO: 2.83 10*3/MM3 (ref 0.7–3.1)
LYMPHOCYTES NFR BLD AUTO: 25.9 % (ref 19.6–45.3)
MCH RBC QN AUTO: 29.4 PG (ref 26.6–33)
MCHC RBC AUTO-ENTMCNC: 32.1 G/DL (ref 31.5–35.7)
MCV RBC AUTO: 91.6 FL (ref 79–97)
MONOCYTES # BLD AUTO: 0.86 10*3/MM3 (ref 0.1–0.9)
MONOCYTES NFR BLD AUTO: 7.9 % (ref 5–12)
NEUTROPHILS NFR BLD AUTO: 65.6 % (ref 42.7–76)
NEUTROPHILS NFR BLD AUTO: 7.15 10*3/MM3 (ref 1.7–7)
NRBC BLD AUTO-RTO: 0 /100 WBC (ref 0–0.2)
PHOSPHATE SERPL-MCNC: 3 MG/DL (ref 2.5–4.5)
PLATELET # BLD AUTO: 317 10*3/MM3 (ref 140–450)
PMV BLD AUTO: 8.6 FL (ref 6–12)
POTASSIUM SERPL-SCNC: 4.8 MMOL/L (ref 3.5–5.2)
RBC # BLD AUTO: 4.29 10*6/MM3 (ref 4.14–5.8)
SODIUM SERPL-SCNC: 136 MMOL/L (ref 136–145)
WBC NRBC COR # BLD AUTO: 10.91 10*3/MM3 (ref 3.4–10.8)

## 2025-01-16 PROCEDURE — 94761 N-INVAS EAR/PLS OXIMETRY MLT: CPT

## 2025-01-16 PROCEDURE — 94799 UNLISTED PULMONARY SVC/PX: CPT

## 2025-01-16 PROCEDURE — 97530 THERAPEUTIC ACTIVITIES: CPT

## 2025-01-16 PROCEDURE — 97112 NEUROMUSCULAR REEDUCATION: CPT

## 2025-01-16 PROCEDURE — 97110 THERAPEUTIC EXERCISES: CPT

## 2025-01-16 PROCEDURE — 94664 DEMO&/EVAL PT USE INHALER: CPT

## 2025-01-16 PROCEDURE — 25010000002 ENOXAPARIN PER 10 MG: Performed by: INTERNAL MEDICINE

## 2025-01-16 PROCEDURE — 25010000002 AMPICILLIN-SULBACTAM PER 1.5 G: Performed by: INTERNAL MEDICINE

## 2025-01-16 PROCEDURE — 63710000001 PREDNISONE PER 1 MG: Performed by: INTERNAL MEDICINE

## 2025-01-16 PROCEDURE — 85025 COMPLETE CBC W/AUTO DIFF WBC: CPT | Performed by: INTERNAL MEDICINE

## 2025-01-16 PROCEDURE — 97535 SELF CARE MNGMENT TRAINING: CPT

## 2025-01-16 PROCEDURE — 80069 RENAL FUNCTION PANEL: CPT | Performed by: INTERNAL MEDICINE

## 2025-01-16 PROCEDURE — 94660 CPAP INITIATION&MGMT: CPT

## 2025-01-16 RX ADMIN — TAMSULOSIN HYDROCHLORIDE 0.4 MG: 0.4 CAPSULE ORAL at 09:32

## 2025-01-16 RX ADMIN — IPRATROPIUM BROMIDE AND ALBUTEROL SULFATE 3 ML: .5; 3 SOLUTION RESPIRATORY (INHALATION) at 12:15

## 2025-01-16 RX ADMIN — CITALOPRAM 20 MG: 20 TABLET, FILM COATED ORAL at 09:32

## 2025-01-16 RX ADMIN — Medication 10 ML: at 20:07

## 2025-01-16 RX ADMIN — ENOXAPARIN SODIUM 40 MG: 100 INJECTION SUBCUTANEOUS at 15:27

## 2025-01-16 RX ADMIN — Medication 10 ML: at 09:33

## 2025-01-16 RX ADMIN — WHITE PETROLATUM 1 APPLICATION: 1.75 OINTMENT TOPICAL at 09:43

## 2025-01-16 RX ADMIN — ATORVASTATIN CALCIUM 10 MG: 10 TABLET ORAL at 09:32

## 2025-01-16 RX ADMIN — ISOSORBIDE MONONITRATE 30 MG: 30 TABLET, EXTENDED RELEASE ORAL at 09:32

## 2025-01-16 RX ADMIN — AMPICILLIN SODIUM AND SULBACTAM SODIUM 3000 MG: 2; 1 INJECTION, POWDER, FOR SOLUTION INTRAMUSCULAR; INTRAVENOUS at 20:07

## 2025-01-16 RX ADMIN — IPRATROPIUM BROMIDE AND ALBUTEROL SULFATE 3 ML: .5; 3 SOLUTION RESPIRATORY (INHALATION) at 06:52

## 2025-01-16 RX ADMIN — PREDNISONE 40 MG: 20 TABLET ORAL at 09:32

## 2025-01-16 RX ADMIN — AMPICILLIN SODIUM AND SULBACTAM SODIUM 3000 MG: 2; 1 INJECTION, POWDER, FOR SOLUTION INTRAMUSCULAR; INTRAVENOUS at 02:11

## 2025-01-16 RX ADMIN — GABAPENTIN 800 MG: 400 CAPSULE ORAL at 13:36

## 2025-01-16 RX ADMIN — AMPICILLIN SODIUM AND SULBACTAM SODIUM 3000 MG: 2; 1 INJECTION, POWDER, FOR SOLUTION INTRAMUSCULAR; INTRAVENOUS at 13:36

## 2025-01-16 RX ADMIN — GABAPENTIN 800 MG: 400 CAPSULE ORAL at 05:35

## 2025-01-16 RX ADMIN — AMPICILLIN SODIUM AND SULBACTAM SODIUM 3000 MG: 2; 1 INJECTION, POWDER, FOR SOLUTION INTRAMUSCULAR; INTRAVENOUS at 09:33

## 2025-01-16 RX ADMIN — ALLOPURINOL 100 MG: 100 TABLET ORAL at 09:32

## 2025-01-16 RX ADMIN — GABAPENTIN 800 MG: 400 CAPSULE ORAL at 21:39

## 2025-01-16 RX ADMIN — ASPIRIN 81 MG: 81 TABLET, COATED ORAL at 09:32

## 2025-01-16 RX ADMIN — AZITHROMYCIN 500 MG: 250 TABLET, FILM COATED ORAL at 09:32

## 2025-01-16 RX ADMIN — IPRATROPIUM BROMIDE AND ALBUTEROL SULFATE 3 ML: .5; 3 SOLUTION RESPIRATORY (INHALATION) at 00:39

## 2025-01-16 RX ADMIN — PANTOPRAZOLE SODIUM 40 MG: 40 TABLET, DELAYED RELEASE ORAL at 05:35

## 2025-01-16 NOTE — CASE MANAGEMENT/SOCIAL WORK
Continued Stay Note  ALEKSANDRA Guardado     Patient Name: Poncho Dalton Sr.  MRN: [id number]  Today's Date: 1/16/2025    Admit Date: 1/13/2025    Plan: D/C Plan: [facility]; Precert to be initiated 1/17/25 am.  PASRR completed.   Discharge Plan       Row Name 01/16/25 1701       Plan    Plan D/C Plan: [facility]; [facility] to be initiated 1/17/25 am.  PASRR completed.               Expected Discharge Date and Time       Expected Discharge Date Expected Discharge Time    Jan 17, 2025               Belen Rodney RN  RN/.  Office Ph. [phone]  Cell Ph.  [phone]

## 2025-01-16 NOTE — PLAN OF CARE
"Goal Outcome Evaluation:    Assessment: Poncho Dalton Sr. presents with functional mobility impairments which indicate the need for skilled intervention. Pt A and O x 4 and eager to get out of bed. Pt was cued for use of Sera Stedy and pt was able to stand with mod assist x 2. Pt able to unload each foot as needed for placement on the foot pad. PT cued pt for more erect posture and anterior weight shift in standing. Pt sat at edge of bed ~ 12 minutes with CGA of 1 in unsupported position. PT assisted pt with charging his phone and computer and he was appreciative of all help offered. Patient is a high risk of injurious falls and unsafe to return to prior living environment at this time.  Tolerating session today without incident. Will continue to follow and progress as tolerated.     Plan/Recommendations:   If medically appropriate, Moderate Intensity Therapy recommended post-acute care. This is recommended as therapy feels the patient would require 3-4 days per week and wouldn't tolerate \"3 hour daily\" rehab intensity. SNF would be the preferred choice. If the patient does not agree to SNF, arrange HH or OP depending on home bound status. If patient is medically complex, consider LTACH. Pt requires no DME at discharge.     Pt desires Skilled Rehab placement at discharge. Pt cooperative; agreeable to therapeutic recommendations and plan of care.                                               "

## 2025-01-16 NOTE — CASE MANAGEMENT/SOCIAL WORK
Continued Stay Note  ALEKSANDRA Guardado     Patient Name: Poncho Dalton Sr.  MRN: [id number]  Today's Date: 1/16/2025    Admit Date: 1/13/2025    Plan: D/C Plan: [facility]; Precert pending updated therapy notes; PASRR request; Transport TBD   Discharge Plan       Row Name 01/16/25 1016       Plan    Plan D/C Plan: [facility]; Precert pending updated therapy notes; PASRR request; Transport TBD                 Expected Discharge Date and Time       Expected Discharge Date Expected Discharge Time    Jan 16, 2025               Belen Rodnye RN  RN/.  Office Ph. [phone]  Cell Ph.  [phone]

## 2025-01-16 NOTE — PROGRESS NOTES
[facility] Progress Note      Patient Name: Poncho Dalton Sr.  : 1950  MRN: [id number]  Primary Care Physician:  Ronaldo Alvarez MD  Date of admission: 2025    Subjective     Interval History:     Patient resting comfortably.    Denies any chest pain, nausea vomiting  Dyspnea improving    Review of Systems:   As noted above    Objective     Vitals:   Temp:  [97.4 °F (36.3 °C)-98.5 °F (36.9 °C)] 97.9 °F (36.6 °C)  Heart Rate:  [] 93  Resp:  [13-27] 16  BP: (105-159)/(72-87) 145/87  Flow (L/min) (Oxygen Therapy):  [2-4] 3    Intake/Output Summary (Last 24 hours) at 2025 0929  Last data filed at 2025 0739  Gross per 24 hour   Intake 1400 ml   Output --   Net 1400 ml       Physical Exam:      General Appearance: Chronically ill-appearing, NAD  Skin: warm and dry  HEENT: oral mucosa normal, nonicteric sclera  Neck: supple, no JVD  Lungs: Decreased breath sound bases.  Few scattered wheezes bilaterally  Heart: RRR, normal S1 and S2  Abdomen: soft, nontender, nondistended  : no palpable bladder  Extremities: Trace bilateral lower extremities edema with venous stasis changes  Neuro: normal speech and mental status     Scheduled Meds:     allopurinol, 100 mg, Oral, Daily  ampicillin-sulbactam, 3,000 mg, Intravenous, Q6H  aspirin, 81 mg, Oral, Daily  atorvastatin, 10 mg, Oral, Daily  azithromycin, 500 mg, Oral, Q24H  citalopram, 20 mg, Oral, Daily  enoxaparin, 40 mg, Subcutaneous, Daily  gabapentin, 800 mg, Oral, Q8H  ipratropium-albuterol, 3 mL, Nebulization, Q6H - RT  isosorbide mononitrate, 30 mg, Oral, Daily  mineral oil-hydrophilic petrolatum, 1 Application, Topical, Daily  pantoprazole, 40 mg, Oral, Q AM  predniSONE, 40 mg, Oral, Daily With Breakfast  sodium chloride, 10 mL, Intravenous, Q12H  tamsulosin, 0.4 mg, Oral, Daily      IV Meds:   sodium chloride, 50 mL/hr, Last Rate: 50 mL/hr (01/15/25 8983)        Results Reviewed:   I have personally  reviewed the results from the time of this admission to 1/16/2025 09:29 EST     Results from last 7 days   Lab Units 01/16/25  0319 01/15/25  0033 01/14/25  0741 01/13/25  2132   SODIUM mmol/L 136 138 135* 136   POTASSIUM mmol/L 4.8 4.3 5.1 5.5*   CHLORIDE mmol/L 100 99 99 98   CO2 mmol/L 25.9 26.2 23.8 26.9   BUN mg/dL 21 35* 29* 29*   CREATININE mg/dL 1.11 1.53* 1.60* 1.56*   CALCIUM mg/dL 9.0 9.1 9.2 9.6   BILIRUBIN mg/dL  --   --   --  0.3   ALK PHOS U/L  --   --   --  110   ALT (SGPT) U/L  --   --   --  15   AST (SGOT) U/L  --   --   --  32   GLUCOSE mg/dL 110* 148* 123* 82     Estimated Creatinine Clearance: 65.6 mL/min (by C-G formula based on SCr of 1.11 mg/dL).  Results from last 7 days   Lab Units 01/16/25  0319 01/15/25  0033   PHOSPHORUS mg/dL 3.0 3.6         Results from last 7 days   Lab Units 01/16/25  0451 01/15/25  0033 01/14/25  0741 01/13/25  2132   WBC 10*3/mm3 10.91* 11.40* 6.47 9.94   HEMOGLOBIN g/dL 12.6* 12.4* 13.1 13.9   PLATELETS 10*3/mm3 317 372 388 402           Assessment / Plan     ASSESSMENT:    Acute kidney injury.  Seems to be prerenal in etiology related to COPD exacerbation.  Creatinine decreased to 1.1 Mg/DL.  Electrolytes okay   hyperkalemia.  Mild.  Improved  COPD exacerbation.  Acute on chronic hypoxic respiratory failure.  Patient is on antibiotics and steroids  History of BPH.  On tamsulosin.  No significant obstructive symptoms  Benign essential hypertension.  Pressure well-controlled    PLAN:  Discontinue IV fluids  Keep off NSAIDs  Okay to discharge from renal standpoint    Juan Balderas MD  01/16/25  09:29 EST    [facility]  [phone]

## 2025-01-16 NOTE — ACP (ADVANCE CARE PLANNING)
Patient requested information about AD/Living Will. Discussed HCR and POST form. Patient reports his NOK would be his three adult children. Gave patient a copy of HCR and POST form. Patient reports that he is to be a FULL code/FULL Intervention. Patient wants to speak with children before completing HCR form.     Chaplain Poncho Tripp

## 2025-01-16 NOTE — PROGRESS NOTES
"[facility] MEDICINE SERVICE  DAILY PROGRESS NOTE    NAME: Poncho Dalton .  : 1950  MRN: [id number]      LOS: 1 day     PROVIDER OF SERVICE: KYLEIGH Ocampo    Chief Complaint: COPD with acute exacerbation    Subjective:     Interval History:  History taken from: patient    Patient otherwise doing well.  Reports he is feeling better.  Patient reports he does not want to go to [facility] because \"they gave me COVID\".  Informed patient that his daughter chose [facility] however will have a discussion with her to determine alternative options.  Spoke with patient daughter over the phone who would like case management to call with a list of other SNF options to make choices from.   has been notified.        Review of Systems:   Review of Systems   Respiratory:  Negative for shortness of breath.    Cardiovascular:  Negative for chest pain.   Neurological:  Negative for dizziness and headaches.       Objective:     Vital Signs  Temp:  [97.4 °F (36.3 °C)-98.2 °F (36.8 °C)] 97.9 °F (36.6 °C)  Heart Rate:  [] 93  Resp:  [13-27] 16  BP: (105-159)/(72-87) 145/87  Flow (L/min) (Oxygen Therapy):  [2-4] 3   Body mass index is 35.51 kg/m².    Physical Exam  Physical Exam  General: 75 yo male, Alert and oriented, obese, no acute distress.  HENT: Normocephalic, normal hearing, moist oral mucosa, no scleral icterus.  Neck: Supple, nontender, no carotid bruits, no JVD, no LAD.  Lungs: nonlabored respiration.  Heart: RRR.  Abdomen: Soft, nontender, nondistended.  Musculoskeletal: Normal range of motion and strength, no tenderness or swelling.  Skin: Skin is warm, dry and pink, no rashes or lesions.  Psychiatric: Cooperative, appropriate mood and affect.       Diagnostic Data    Results from last 7 days   Lab Units 25  0451 25  0319 25  0741 25  2132   WBC 10*3/mm3 10.91*  --    < > 9.94   HEMOGLOBIN g/dL 12.6*  --    < > 13.9   HEMATOCRIT % 39.3  --    < > 42.6 "   PLATELETS 10*3/mm3 317  --    < > 402   GLUCOSE mg/dL  --  110*   < > 82   CREATININE mg/dL  --  1.11   < > 1.56*   BUN mg/dL  --  21   < > 29*   SODIUM mmol/L  --  136   < > 136   POTASSIUM mmol/L  --  4.8   < > 5.5*   AST (SGOT) U/L  --   --   --  32   ALT (SGPT) U/L  --   --   --  15   ALK PHOS U/L  --   --   --  110   BILIRUBIN mg/dL  --   --   --  0.3   ANION GAP mmol/L  --  10.1   < > 11.1    < > = values in this interval not displayed.       US Renal Bilateral    Result Date: 1/14/2025  Impression: Symmetric mild renal atrophy and renal cortical thinning, otherwise no acute finding. Electronically Signed: Quan Escalante MD  1/14/2025 2:49 PM EST  Workstation ID: [id number]       I reviewed the patient's new clinical results.    Assessment/Plan:     Active and Resolved Problems  Active Hospital Problems    Diagnosis  POA    **COPD with acute exacerbation [J44.1]  Unknown    COPD exacerbation [J44.1]  Yes    Acute on chronic respiratory failure with hypoxia [J96.21]  Yes    Acute on chronic hypoxic respiratory failure [J96.21]  Unknown    Hyperkalemia [E87.5]  Unknown    Hyperlipidemia [E78.5]  Yes    Chronic obstructive pulmonary disease [J44.9]  Yes    Obstructive sleep apnea syndrome [G47.33]  Yes    Impairment of balance [R26.89]  Yes    Gastroesophageal reflux disease [K21.9]  Yes    Essential hypertension [I10]  Yes    Depressive disorder [F32.A]  Yes    Benign prostatic hyperplasia [N40.0]  Yes    Generalized anxiety disorder [F41.1]  Yes      Resolved Hospital Problems   No resolved problems to display.       COPD exacerbation with acute on chronic hypoxic respiratory failure [3 L baseline]  -Chest x-ray showed bilateral perihilar interstitial thickening may be on the basis of interstitial edema.  TTE and BNP ordered  -Scheduled DuoNebs every 6 hours, prednisone 40 mg daily, azithromycin, Unasyn  -Pulmonary toileting  -Maintain SpO2 greater than 88% in a COPD patient     Lower extremity numbness and  "weakness with history of falling, likely signs of PAD  -PT/OT recommending SNF, patient and daughter Regis agreeable to this disposition  -ABIs lower extremity bilaterally moderately reduced  -Vascular surgery consulted, see note. Per Dr. Espinal, \"Recommend keeping lower extremities warm and protected, continue wound care, and will arrange outpatient follow up in 1 month\".      Hyperkalemia- resolved  -Lasix 20 mg x 1 done 1/14/2025   -K+ today 4.8     STEPHEN   -Continue to trend creatinine, avoid nephrotoxic medications as able.  -Renal ultrasound showed \"Symmetric mild renal atrophy and renal cortical thinning\"  -Nephrology consulted and following  -Cr has trended down to 1.11 from 1.53     Chronic pain-continue home Norco, gabapentin, as needed baclofen holding.  Meloxicam in the setting of poor kidney function.     ELVIS-CPAP/BiPAP ordered     GERD-PPI     HTN-restart hypertensive medication      BPH-tamsulosin     SHEILA-SSRI restart     Gout-allopurinol     HLD-statin       VTE Prophylaxis:  Pharmacologic VTE prophylaxis orders are present.             Disposition Planning:     Barriers to Discharge: Placement, continued care  Anticipated Date of Discharge: 1-  Place of Discharge: SNF      Time: 35 minutes     Code Status and Medical Interventions: CPR (Attempt to Resuscitate); Full Support   Ordered at: 01/14/25 0000     Level Of Support Discussed With:    Patient     Code Status (Patient has no pulse and is not breathing):    CPR (Attempt to Resuscitate)     Medical Interventions (Patient has pulse or is breathing):    Full Support       Signature: Electronically signed by KYLEIGH Ocampo, 01/16/25, 11:57 EST.  [facility] Hospitalist Team    "

## 2025-01-16 NOTE — THERAPY TREATMENT NOTE
"Subjective: Pt agreeable to therapeutic plan of care.    Objective:     Precautions - HFR    Bed mobility - Mod-A and Assist x 2  Transfers - Mod-A and Assist x 2 with Arleth Stedy transfer device for 3 sit to stands and bed to recliner transfer    Vitals: WNL with O2 at 3 L    Pain: 0 VAS   Location: N/A  Intervention for pain: N/A    Education: Provided education on the importance of mobility in the acute care setting, Verbal/Tactile Cues, Transfer Training, and Energy conservation strategies    Assessment: Poncho Dalton Sr. presents with functional mobility impairments which indicate the need for skilled intervention. Pt A and O x 4 and eager to get out of bed. Pt was cued for use of Sera Stedy and pt was able to stand with mod assist x 2. Pt able to unload each foot as needed for placement on the foot pad. PT cued pt for more erect posture and anterior weight shift in standing. Pt sat at edge of bed ~ 12 minutes with CGA of 1 in unsupported position. PT assisted pt with charging his phone and computer and he was appreciative of all help offered. Patient is a high risk of injurious falls and unsafe to return to prior living environment at this time.   Tolerating session today without incident. Will continue to follow and progress as tolerated.     Plan/Recommendations:   If medically appropriate, Moderate Intensity Therapy recommended post-acute care. This is recommended as therapy feels the patient would require 3-4 days per week and wouldn't tolerate \"3 hour daily\" rehab intensity. SNF would be the preferred choice. If the patient does not agree to SNF, arrange HH or OP depending on home bound status. If patient is medically complex, consider LTACH. Pt requires no DME at discharge.     Pt desires Skilled Rehab placement at discharge. Pt cooperative; agreeable to therapeutic recommendations and plan of care.         Basic Mobility 6-click:  Rollin = Total, A lot = 2, A little = 3; 4 = " None  Supine>Sit:   1 = Total, A lot = 2, A little = 3; 4 = None   Sit>Stand with arms:  1 = Total, A lot = 2, A little = 3; 4 = None  Bed>Chair:   1 = Total, A lot = 2, A little = 3; 4 = None  Ambulate in room:  1 = Total, A lot = 2, A little = 3; 4 = None  3-5 Steps with railin = Total, A lot = 2, A little = 3; 4 = None  Score: 10    Modified Anjana: N/A = No pre-op stroke/TIA    Post-Tx Position: Up in Chair, Alarms activated, and Call light and personal items within reach  PPE: gloves    Therapy Charges for Today       Code Description Service Date Service Provider Modifiers Qty    23406787326 HC PT THERAPEUTIC ACT EA 15 MIN 2025 Coretta Charles, PT GP 2    94990962144 HC PT NEUROMUSC RE EDUCATION EA 15 MIN 2025 Coretta Charles, PT GP 1           PT Charges       Row Name 25 1140 25 1137          Time Calculation    Start Time -- 1022  -BR     Stop Time -- 1101  -BR     Time Calculation (min) -- 39 min  -BR     PT Received On 25  -BR 25  -BR     PT - Next Appointment -- 25  -BR        Time Calculation- PT    Total Timed Code Minutes- PT -- 39 minute(s)  -BR               User Key  (r) = Recorded By, (t) = Taken By, (c) = Cosigned By      Initials Name Provider Type    BR Coretta Charles, PT Physical Therapist

## 2025-01-16 NOTE — PLAN OF CARE
Goal Outcome Evaluation:  Plan of Care Reviewed With: patient        Progress: no change  Outcome Evaluation: pt on 3L n.c. O2, NS continuous IV and IV antibiotics, tolerated well, specialty bed ordered for skin health and Q2 turns, pt remains in falls risk

## 2025-01-16 NOTE — THERAPY TREATMENT NOTE
"Subjective: Pt agreeable to therapeutic plan of care.  Cognition: oriented to Person, Place, Time, and Situation    Objective:     Precautions - Fall    Bed Mobility: Mod-A and Assist x 2, verbal cueing for utilization of bed rails and appropriate positioning  Functional Transfers: N/A or Not attempted.  Balance: supported and standing Mod-A and Assist x 2  Functional Ambulation: N/A or Not attempted. Arleth Stedy use for bed<>chair transfer.    Lower Body Dressing: Dependent  ADL Position: supine  ADL Comments: Don socks    Therapeutic Exercise - 10 Reps B UE AROM supported sitting / chair    Vitals: Hypertensive, 156/102, 139/91    Pain: 0 VAS  Location: N/A  Interventions for pain: N/A  Education: Provided education on the importance of mobility in the acute care setting, Verbal/Tactile Cues, ADL training, and Transfer Training    Assessment: Poncho Dalton Sr. presents with ADL impairments affecting function including balance, coordination, endurance / activity tolerance, motor planning, range of motion (ROM), and strength. Pt A&O x4, no reports of pain. Pt completed bed mobility with mod A x2 with verbal cueing for appropriate positioning. Pt sat EOB with SBA and comes to standing with use of Arleth Stedy and mod A x2. Pt transferred to chair with Arleth Stedy and CGA for safety. In chair, pt completed BUE exercises, noted with limited BUE ROM. Demonstrated functioning below baseline abilities indicate the need for continued skilled intervention while inpatient. Tolerating session today without incident. Will continue to follow and progress as tolerated.     Plan/Recommendations:   Moderate Intensity Therapy recommended post-acute care. This is recommended as therapy feels the patient would require 3-4 days per week and wouldn't tolerate \"3 hour daily\" rehab intensity. SNF would be the preferred choice. If the patient does not agree to SNF, arrange HH or OP depending on home bound status. If patient is medically " complex, consider LTACH.    Pt desires Skilled Rehab placement at discharge. Pt cooperative; agreeable to therapeutic recommendations and plan of care.     Modified Alexander: N/A = No pre-op stroke/TIA    Post-Tx Position: Up in Chair, Alarms activated, and Call light and personal items within reach  PPE: gloves and surgical mask    Therapy Charges for Today       Code Description Service Date Service Provider Modifiers Qty    28635365207 HC OT SELF CARE/MGMT/TRAIN EA 15 MIN 1/16/2025 Francis De Guzman, OT GO 1    36763551276 HC OT THERAPEUTIC ACT EA 15 MIN 1/16/2025 Francis De Guzman OT GO 1    [id number]  OT THER PROC EA 15 MIN 1/16/2025 Francis De Guzman OT GO 1           Time Calculation- OT       Row Name 01/16/25 1148             Time Calculation- OT    OT Start Time 1022  -SP      OT Stop Time 1101  -SP      OT Time Calculation (min) 39 min  -SP      Total Timed Code Minutes- OT 39 minute(s)  -SP                User Key  (r) = Recorded By, (t) = Taken By, (c) = Cosigned By      Initials Name Provider Type    SP Francis De Guzman, OT Occupational Therapist

## 2025-01-16 NOTE — PLAN OF CARE
"Assessment: Poncho Dalton Sr. presents with ADL impairments affecting function including balance, coordination, endurance / activity tolerance, motor planning, range of motion (ROM), and strength. Pt A&O x4, no reports of pain. Pt completed bed mobility with mod A x2 with verbal cueing for appropriate positioning. Pt sat EOB with SBA and comes to standing with use of Arleth Stedy and mod A x2. Pt transferred to chair with Arleth Stedy and CGA for safety. In chair, pt completed BUE exercises, noted with limited BUE ROM. Demonstrated functioning below baseline abilities indicate the need for continued skilled intervention while inpatient. Tolerating session today without incident. Will continue to follow and progress as tolerated.      Plan/Recommendations:   Moderate Intensity Therapy recommended post-acute care. This is recommended as therapy feels the patient would require 3-4 days per week and wouldn't tolerate \"3 hour daily\" rehab intensity. SNF would be the preferred choice. If the patient does not agree to SNF, arrange HH or OP depending on home bound status. If patient is medically complex, consider LTACH.  "

## 2025-01-16 NOTE — PLAN OF CARE
Goal Outcome Evaluation:  Plan of Care Reviewed With: patient           Outcome Evaluation: Pt resting in bed with no complaints at this tieme. On specialty bed. Q2 turn. Awaiting placement.

## 2025-01-17 VITALS
WEIGHT: 220 LBS | SYSTOLIC BLOOD PRESSURE: 128 MMHG | TEMPERATURE: 97.3 F | RESPIRATION RATE: 18 BRPM | BODY MASS INDEX: 35.36 KG/M2 | OXYGEN SATURATION: 95 % | DIASTOLIC BLOOD PRESSURE: 78 MMHG | HEIGHT: 66 IN | HEART RATE: 83 BPM

## 2025-01-17 LAB
ALBUMIN SERPL-MCNC: 4.2 G/DL (ref 3.5–5.2)
ANION GAP SERPL CALCULATED.3IONS-SCNC: 11.6 MMOL/L (ref 5–15)
BUN SERPL-MCNC: 18 MG/DL (ref 8–23)
BUN/CREAT SERPL: 17.5 (ref 7–25)
CALCIUM SPEC-SCNC: 9.3 MG/DL (ref 8.6–10.5)
CHLORIDE SERPL-SCNC: 97 MMOL/L (ref 98–107)
CO2 SERPL-SCNC: 26.4 MMOL/L (ref 22–29)
CREAT SERPL-MCNC: 1.03 MG/DL (ref 0.76–1.27)
DEPRECATED RDW RBC AUTO: 52.4 FL (ref 37–54)
EGFRCR SERPLBLD CKD-EPI 2021: 76.7 ML/MIN/1.73
ERYTHROCYTE [DISTWIDTH] IN BLOOD BY AUTOMATED COUNT: 15.7 % (ref 12.3–15.4)
GLUCOSE SERPL-MCNC: 105 MG/DL (ref 65–99)
HCT VFR BLD AUTO: 40.4 % (ref 37.5–51)
HGB BLD-MCNC: 13.1 G/DL (ref 13–17.7)
MCH RBC QN AUTO: 29.8 PG (ref 26.6–33)
MCHC RBC AUTO-ENTMCNC: 32.4 G/DL (ref 31.5–35.7)
MCV RBC AUTO: 92 FL (ref 79–97)
PHOSPHATE SERPL-MCNC: 2.4 MG/DL (ref 2.5–4.5)
PLATELET # BLD AUTO: 347 10*3/MM3 (ref 140–450)
PMV BLD AUTO: 9.4 FL (ref 6–12)
POTASSIUM SERPL-SCNC: 4.2 MMOL/L (ref 3.5–5.2)
RBC # BLD AUTO: 4.39 10*6/MM3 (ref 4.14–5.8)
SODIUM SERPL-SCNC: 135 MMOL/L (ref 136–145)
WBC NRBC COR # BLD AUTO: 10.32 10*3/MM3 (ref 3.4–10.8)

## 2025-01-17 PROCEDURE — 80069 RENAL FUNCTION PANEL: CPT | Performed by: INTERNAL MEDICINE

## 2025-01-17 PROCEDURE — 25010000002 AMPICILLIN-SULBACTAM PER 1.5 G: Performed by: INTERNAL MEDICINE

## 2025-01-17 PROCEDURE — 25010000002 ENOXAPARIN PER 10 MG: Performed by: INTERNAL MEDICINE

## 2025-01-17 PROCEDURE — 63710000001 PREDNISONE PER 1 MG: Performed by: INTERNAL MEDICINE

## 2025-01-17 PROCEDURE — 94799 UNLISTED PULMONARY SVC/PX: CPT

## 2025-01-17 PROCEDURE — 94660 CPAP INITIATION&MGMT: CPT

## 2025-01-17 PROCEDURE — 85027 COMPLETE CBC AUTOMATED: CPT | Performed by: INTERNAL MEDICINE

## 2025-01-17 RX ORDER — HYDROCODONE BITARTRATE AND ACETAMINOPHEN 10; 325 MG/1; MG/1
1 TABLET ORAL EVERY 8 HOURS PRN
Qty: 9 TABLET | Refills: 0 | Status: SHIPPED | OUTPATIENT
Start: 2025-01-17 | End: 2025-01-20

## 2025-01-17 RX ORDER — CITALOPRAM HYDROBROMIDE 40 MG/1
20 TABLET ORAL DAILY
Start: 2025-01-17

## 2025-01-17 RX ORDER — HYDROCODONE BITARTRATE AND ACETAMINOPHEN 10; 325 MG/1; MG/1
1 TABLET ORAL EVERY 8 HOURS PRN
Status: CANCELLED
Start: 2025-01-17

## 2025-01-17 RX ORDER — BACLOFEN 20 MG/1
20 TABLET ORAL 2 TIMES DAILY PRN
Start: 2025-01-17

## 2025-01-17 RX ORDER — PREDNISONE 20 MG/1
40 TABLET ORAL
Qty: 2 TABLET | Refills: 0 | Status: SHIPPED | OUTPATIENT
Start: 2025-01-18 | End: 2025-01-19

## 2025-01-17 RX ORDER — GABAPENTIN 800 MG/1
800 TABLET ORAL 3 TIMES DAILY
Qty: 15 TABLET | Refills: 0 | Status: SHIPPED | OUTPATIENT
Start: 2025-01-17 | End: 2025-01-22

## 2025-01-17 RX ADMIN — AMPICILLIN SODIUM AND SULBACTAM SODIUM 3000 MG: 2; 1 INJECTION, POWDER, FOR SOLUTION INTRAMUSCULAR; INTRAVENOUS at 03:10

## 2025-01-17 RX ADMIN — AZITHROMYCIN 500 MG: 250 TABLET, FILM COATED ORAL at 08:44

## 2025-01-17 RX ADMIN — GABAPENTIN 800 MG: 400 CAPSULE ORAL at 05:09

## 2025-01-17 RX ADMIN — AMPICILLIN SODIUM AND SULBACTAM SODIUM 3000 MG: 2; 1 INJECTION, POWDER, FOR SOLUTION INTRAMUSCULAR; INTRAVENOUS at 08:44

## 2025-01-17 RX ADMIN — PANTOPRAZOLE SODIUM 40 MG: 40 TABLET, DELAYED RELEASE ORAL at 05:09

## 2025-01-17 RX ADMIN — TAMSULOSIN HYDROCHLORIDE 0.4 MG: 0.4 CAPSULE ORAL at 08:44

## 2025-01-17 RX ADMIN — ISOSORBIDE MONONITRATE 30 MG: 30 TABLET, EXTENDED RELEASE ORAL at 08:44

## 2025-01-17 RX ADMIN — ATORVASTATIN CALCIUM 10 MG: 10 TABLET ORAL at 08:44

## 2025-01-17 RX ADMIN — ENOXAPARIN SODIUM 40 MG: 100 INJECTION SUBCUTANEOUS at 16:06

## 2025-01-17 RX ADMIN — WHITE PETROLATUM 1 APPLICATION: 1.75 OINTMENT TOPICAL at 09:12

## 2025-01-17 RX ADMIN — ALLOPURINOL 100 MG: 100 TABLET ORAL at 08:44

## 2025-01-17 RX ADMIN — CITALOPRAM 20 MG: 20 TABLET, FILM COATED ORAL at 08:44

## 2025-01-17 RX ADMIN — GABAPENTIN 800 MG: 400 CAPSULE ORAL at 13:22

## 2025-01-17 RX ADMIN — Medication 10 ML: at 03:11

## 2025-01-17 RX ADMIN — PREDNISONE 40 MG: 20 TABLET ORAL at 08:44

## 2025-01-17 RX ADMIN — Medication 10 ML: at 08:45

## 2025-01-17 RX ADMIN — AMPICILLIN SODIUM AND SULBACTAM SODIUM 3000 MG: 2; 1 INJECTION, POWDER, FOR SOLUTION INTRAMUSCULAR; INTRAVENOUS at 13:22

## 2025-01-17 RX ADMIN — ASPIRIN 81 MG: 81 TABLET, COATED ORAL at 08:44

## 2025-01-17 NOTE — PLAN OF CARE
Goal Outcome Evaluation:  Plan of Care Reviewed With: patient        Progress: improving  Outcome Evaluation: pt was up in chair off and on during the night, A x 2 to BSC twice, did not rest much at all, maintaining on 3L n.c. , d/c to snf when medically discharged

## 2025-01-17 NOTE — PLAN OF CARE
Goal Outcome Evaluation:  Plan of Care Reviewed With: patient           Outcome Evaluation: Pt discharging to [facility]. Report called Letty

## 2025-01-17 NOTE — SIGNIFICANT NOTE
Case Management/Social Work    Patient Name:  Poncho Dalton Sr.  YOB: 1950  MRN: [id number]  Admit Date:  1/13/2025 01/17/25 0938   Post Acute Pre-Cert Documentation   Request Submitted by Facility - Type: Hospital   Post-Acute Authorization Type Submitted: SNF   Date Post Acute Pre-Cert Inititated per Facility 01/17/25   Verification from Payer Yes   Date Post Acute Pre-Cert Completed 01/17/25   Accepting Facility [facility] Discharge Date Requested 01/17/25   All Clinicals Submitted? Yes   Had Accepting Facility at Time of Submission Yes   Response Received from Insurance? Approval   Response Communicated to:    Authorization Number: [id number]   Post Acute Pre-Cert Initiated Comment STANISLAW submitted SNF precert for [facility] via [employer] portal. Auth ID [id number] . SNF precert auto approved. Valid 1/17-1/23. CM made aware.           Electronically signed by:  Kyrie Brown CMA  01/17/25 09:40 EST    Kyrie Brown  Case Management Associate  [address] [address]  P: [phone]  F: [phone]

## 2025-01-17 NOTE — DISCHARGE PLACEMENT REQUEST
"Lauren Dalton SrMarcelino (74 y.o. Male)       Date of Birth   1950    Social Security Number       Address   [address] [place] [address] [place] [facility]    Home Phone   [phone]    MRN   [id number]       Judaism   None    Marital Status                               Admission Date   25    Admission Type   Emergency    Admitting Provider   Azar Pineda MD    Attending Provider   Azar Pineda MD    Department, Room/Bed   [facility] 2D, 256/1       Discharge Date       Discharge Disposition       Discharge Destination                                 Attending Provider: Azar Pineda MD    Allergies: No Known Allergies    Isolation: None   Infection: None   Code Status: CPR    Ht: 167.6 cm (66\")   Wt: 99.8 kg (220 lb)    Admission Cmt: None   Principal Problem: COPD with acute exacerbation [J44.1]                   Active Insurance as of 2025       Primary Coverage       Payor Plan Insurance Group Employer/Plan Group    ANTHEM MEDICARE REPLACEMENT ANTHEM MED ADV HMO [id number]       Payor Plan Address Payor Plan Phone Number Payor Plan Fax Number Effective Dates    [address] [phone]  2024 - None Entered    [facility] [id number]         Subscriber Name Subscriber Birth Date Member ID       LAUREN DALTON SRMarcelino 1950 [id number]                     Emergency Contacts        (Rel.) Home Phone Work Phone Mobile Phone    SCOTT CLINE (Daughter) -- -- [phone]                 History & Physical        Jaylan Zee MD at [address] [place] [address] Medicine Services  History & Physical    Patient Name: Lauren Dalton Sr.  : 1950  MRN: [id number]  Primary Care Physician:  Ronaldo Alvarez MD  Date of admission: 2025  Date and Time of Service: 2025 at 00:10 EST     Subjective      Chief Complaint:   Chief Complaint   Patient presents with    Shortness of Breath        History of Present Illness: Lauren CAZARES" Sha Greer is a 74 y.o. male with a CMH of COPD, chonic 3L o2 at home who presented to [facility] on 1/13/2025 with increasing shortness of breath over the last couple days.  Patient is a poor historian which may be related to his medical  understanding of question.  States that he has been having weak legs since around 9 PM day of arrival which made him concerned that he wanted to come into the hospital for further evaluation.  While EMS was en route patient was  noted to need 5 L nasal cannula to maintain oxygenation.  Patient given DuoNebs and Solu-Medrol en route.  -Afterward he admits that he has been having shortness of breath for the past 2 to 3 days in duration with associated symptoms of chills, dizziness, coughing.  Admits to constipation for the past 3 days in duration without a bowel movement.  Denies any sick contacts  -Vital signs in the ER significant for tachycardia, tachypnea, mild elevated blood pressure, patient tolerating nasal cannula.  Vital signs in the ER showed mild hyperkalemia, STEPHEN, mild interstitial edema noted on chest x-ray.  Patient was treated with DuoNeb and albuterol neb while in the ER.  Oxygen weaned down from 5 L to 3 L.    Review of Systems   Constitutional:  Positive for fatigue. Negative for chills, diaphoresis and fever.   HENT:  Negative for congestion, sneezing and sore throat.    Eyes:  Negative for visual disturbance.   Respiratory:  Positive for cough, shortness of breath and wheezing. Negative for chest tightness.    Cardiovascular:  Negative for chest pain, palpitations and leg swelling.   Gastrointestinal:  Positive for constipation. Negative for abdominal distention, abdominal pain, diarrhea, nausea and vomiting.   Genitourinary:  Negative for decreased urine volume, difficulty urinating, dysuria, frequency and urgency.   Musculoskeletal:  Negative for arthralgias, gait problem and myalgias.   Skin:  Negative for color change, pallor and wound.    Neurological:  Positive for weakness (legs). Negative for dizziness, syncope, light-headedness and headaches.   Psychiatric/Behavioral:  Negative for agitation, behavioral problems, confusion and decreased concentration.        Personal History     History reviewed. No pertinent past medical history.    Past Surgical History:   Procedure Laterality Date    CARDIAC CATHETERIZATION         Family History: family history is not on file. Otherwise pertinent FHx was reviewed and not pertinent to current issue.    Social History:  reports that he has been smoking cigarettes. He has never used smokeless tobacco. Alcohol use questions deferred to the physician. Drug use questions deferred to the physician.    Home Medications:  Prior to Admission Medications       Prescriptions Last Dose Informant Patient Reported? Taking?    albuterol sulfate  (90 Base) MCG/ACT inhaler Past Month  Yes Yes    allopurinol (ZYLOPRIM) 100 MG tablet Past Week  Yes Yes    Take 1 tablet by mouth.    ammonium lactate (LAC-HYDRIN) 12 % lotion Past Week  Yes Yes    aspirin (aspirin) 81 MG EC tablet 1/13/2025  No Yes    Take 1 tablet by mouth Daily.    baclofen (LIORESAL) 20 MG tablet 1/12/2025  Yes Yes    Calcium Carbonate-Vitamin D (Oyster Shell Calcium/D) 250-125 MG-UNIT tablet   Yes No    Take 1 tablet by mouth Daily.    cholecalciferol (VITAMIN D3) 25 MCG (1000 UT) tablet 1/12/2025  Yes Yes    Take 1 tablet by mouth Daily.    citalopram (CeleXA) 40 MG tablet 1/12/2025  Yes Yes    DULoxetine (CYMBALTA) 60 MG capsule 1/12/2025  Yes Yes    gabapentin (NEURONTIN) 800 MG tablet 1/12/2025  Yes Yes    HYDROcodone-acetaminophen (NORCO)  MG per tablet 1/12/2025  Yes Yes    isosorbide mononitrate (IMDUR) 30 MG 24 hr tablet 1/12/2025  No Yes    TAKE ONE TABLET BY MOUTH EVERY MORNING    meclizine (ANTIVERT) 25 MG tablet 1/12/2025  Yes Yes    meloxicam (MOBIC) 15 MG tablet 1/12/2025  Yes Yes    nitroglycerin (NITROSTAT) 0.4 MG SL tablet Past  Month  Yes Yes    omeprazole (priLOSEC) 20 MG capsule 1/12/2025  Yes Yes    pravastatin (PRAVACHOL) 40 MG tablet 1/12/2025  Yes Yes    Take  by mouth.    tamsulosin (FLOMAX) 0.4 MG capsule 24 hr capsule 1/12/2025  Yes Yes              Allergies:  No Known Allergies    Objective      Vitals:   Temp:  [97.9 °F (36.6 °C)] 97.9 °F (36.6 °C)  Heart Rate:  [] 81  Resp:  [13-20] 20  BP: (124-150)/() 129/99  Body mass index is 38.41 kg/m².  Physical Exam  Vitals and nursing note reviewed.   Constitutional:       General: He is in acute distress.      Appearance: Normal appearance. He is obese. He is ill-appearing and toxic-appearing.   HENT:      Head: Normocephalic and atraumatic.      Nose: Nose normal.      Mouth/Throat:      Mouth: Mucous membranes are moist.      Pharynx: Oropharynx is clear.   Eyes:      General: No scleral icterus.     Extraocular Movements: Extraocular movements intact.      Conjunctiva/sclera: Conjunctivae normal.   Cardiovascular:      Rate and Rhythm: Normal rate and regular rhythm.      Pulses: Normal pulses.      Heart sounds: Normal heart sounds. No murmur heard.     No friction rub. No gallop.   Pulmonary:      Effort: Respiratory distress present.      Breath sounds: Wheezing present. No rhonchi or rales.   Abdominal:      General: Abdomen is flat. Bowel sounds are normal. There is no distension.      Palpations: Abdomen is soft.      Tenderness: There is no abdominal tenderness. There is no guarding.   Musculoskeletal:      Cervical back: Normal range of motion. No rigidity or tenderness.      Right lower leg: No edema.      Left lower leg: No edema.   Skin:     General: Skin is warm.      Coloration: Skin is not jaundiced or pale.      Comments: Lower extremity red bilaterally   Neurological:      General: No focal deficit present.      Mental Status: He is alert and oriented to person, place, and time. Mental status is at baseline.      Cranial Nerves: No cranial nerve  deficit.      Sensory: Sensory deficit (decreased sensation noted on the anterior portion of lower extremity bilaterally) present.      Motor: No weakness.   Psychiatric:         Mood and Affect: Mood normal.         Behavior: Behavior normal.         Thought Content: Thought content normal.         Judgment: Judgment normal.         Diagnostic Data:  Lab Results (last 24 hours)       Procedure Component Value Units Date/Time    Respiratory Panel PCR w/COVID-19(SARS-CoV-2) DILEEP/RORO/YANELI/PAD/COR/JONG In-House, NP Swab in UTM/VTM, 2 HR TAT - Swab, Nasopharynx [[id number]] Collected: 01/13/25 2338    Specimen: Swab from Nasopharynx Updated: 01/13/25 2339    High Sensitivity Troponin T 1Hr [[id number]]  (Abnormal) Collected: 01/13/25 2238    Specimen: Blood Updated: 01/13/25 2305     HS Troponin T 27 ng/L      Troponin T Numeric Delta -1 ng/L      Troponin T % Delta -4 %     Narrative:      High Sensitive Troponin T Reference Range:  <14.0 ng/L- Negative Female for AMI  <22.0 ng/L- Negative Male for AMI  >=14 - Abnormal Female indicating possible myocardial injury.  >=22 - Abnormal Male indicating possible myocardial injury.   Clinicians would have to utilize clinical acumen, EKG, Troponin, and serial changes to determine if it is an Acute Myocardial Infarction or myocardial injury due to an underlying chronic condition.         Extra Tubes [[id number]] Collected: 01/13/25 2205    Specimen: Blood, Venous Line Updated: 01/13/25 2215    Narrative:      The following orders were created for panel order Extra Tubes.  Procedure                               Abnormality         Status                     ---------                               -----------         ------                     Gold Top - [facility][[id number]]                                   Final result               Light Blue Top[[id number]]                                                                Please view results for these tests on the individual orders.    Gold  Top - SST [[id number]] Collected: 01/13/25 2205    Specimen: Blood Updated: 01/13/25 2215     Extra Tube Hold for add-ons.     Comment: Auto resulted.       Blood Culture - Blood, Hand, Left [[id number]] Collected: 01/13/25 2200    Specimen: Blood from Hand, Left Updated: 01/13/25 2202    Comprehensive Metabolic Panel [[id number]]  (Abnormal) Collected: 01/13/25 2132    Specimen: Blood Updated: 01/13/25 2202     Glucose 82 mg/dL      BUN 29 mg/dL      Creatinine 1.56 mg/dL      Sodium 136 mmol/L      Potassium 5.5 mmol/L      Chloride 98 mmol/L      CO2 26.9 mmol/L      Calcium 9.6 mg/dL      Total Protein 8.0 g/dL      Albumin 4.0 g/dL      ALT (SGPT) 15 U/L      AST (SGOT) 32 U/L      Alkaline Phosphatase 110 U/L      Total Bilirubin 0.3 mg/dL      Globulin 4.0 gm/dL      A/G Ratio 1.0 g/dL      BUN/Creatinine Ratio 18.6     Anion Gap 11.1 mmol/L      eGFR 46.6 mL/min/1.73     Narrative:      GFR Categories in Chronic Kidney Disease (CKD)      GFR Category          GFR (mL/min/1.73)    Interpretation  G1                     90 or greater         Normal or high (1)  G2                      60-89                Mild decrease (1)  G3a                   45-59                Mild to moderate decrease  G3b                   30-44                Moderate to severe decrease  G4                    15-29                Severe decrease  G5                    14 or less           Kidney failure          (1)In the absence of evidence of kidney disease, neither GFR category G1 or G2 fulfill the criteria for CKD.    eGFR calculation 2021 CKD-EPI creatinine equation, which does not include race as a factor    BNP [[id number]]  (Normal) Collected: 01/13/25 2132    Specimen: Blood Updated: 01/13/25 2202     proBNP 143.0 pg/mL     Narrative:      This assay is used as an aid in the diagnosis of individuals suspected of having heart failure. It can be used as an aid in the diagnosis of acute decompensated heart failure (ADHF) in patients  presenting with signs and symptoms of ADHF to the emergency department (ED). In addition, NT-proBNP of <300 pg/mL indicates ADHF is not likely.    Age Range Result Interpretation  NT-proBNP Concentration (pg/mL:      <50             Positive            >450                   Gray                 300-450                    Negative             <300    50-75           Positive            >900                  Gray                300-900                  Negative            <300      >75             Positive            >1800                  Gray                300-1800                  Negative            <300    High Sensitivity Troponin T [[id number]]  (Abnormal) Collected: 01/13/25 2132    Specimen: Blood Updated: 01/13/25 2202     HS Troponin T 28 ng/L     Narrative:      High Sensitive Troponin T Reference Range:  <14.0 ng/L- Negative Female for AMI  <22.0 ng/L- Negative Male for AMI  >=14 - Abnormal Female indicating possible myocardial injury.  >=22 - Abnormal Male indicating possible myocardial injury.   Clinicians would have to utilize clinical acumen, EKG, Troponin, and serial changes to determine if it is an Acute Myocardial Infarction or myocardial injury due to an underlying chronic condition.         Blood Gas, Arterial - [[id number]]  (Abnormal) Collected: 01/13/25 2141    Specimen: Arterial Blood Updated: 01/13/25 2150     Site Left Radial     Milton's Test Positive     pH, Arterial 7.473 pH units      pCO2, Arterial 36.1 mm Hg      pO2, Arterial 96.5 mm Hg      HCO3, Arterial 26.5 mmol/L      Base Excess, Arterial 3.0 mmol/L      Comment: Serial Number: [id number]:  [id number]        O2 Saturation, Arterial 98.0 %      CO2 Content 27.6 mmol/L      Barometric Pressure for Blood Gas --     Comment: N/A        Modality Cannula     FIO2 40 %      Hemodilution No     PO2/FIO2 241    CBC & Differential [[id number]]  (Abnormal) Collected: 01/13/25 2132    Specimen: Blood Updated: 01/13/25 2141    Narrative:       The following orders were created for panel order CBC & Differential.  Procedure                               Abnormality         Status                     ---------                               -----------         ------                     CBC Auto Differential[[id number]]        Abnormal            Final result                 Please view results for these tests on the individual orders.    CBC Auto Differential [[id number]]  (Abnormal) Collected: 01/13/25 2132    Specimen: Blood Updated: 01/13/25 2141     WBC 9.94 10*3/mm3      RBC 4.63 10*6/mm3      Hemoglobin 13.9 g/dL      Hematocrit 42.6 %      MCV 92.0 fL      MCH 30.0 pg      MCHC 32.6 g/dL      RDW 15.1 %      RDW-SD 50.4 fl      MPV 8.6 fL      Platelets 402 10*3/mm3      Neutrophil % 84.5 %      Lymphocyte % 11.6 %      Monocyte % 2.8 %      Eosinophil % 0.1 %      Basophil % 0.3 %      Immature Grans % 0.7 %      Neutrophils, Absolute 8.40 10*3/mm3      Lymphocytes, Absolute 1.15 10*3/mm3      Monocytes, Absolute 0.28 10*3/mm3      Eosinophils, Absolute 0.01 10*3/mm3      Basophils, Absolute 0.03 10*3/mm3      Immature Grans, Absolute 0.07 10*3/mm3      nRBC 0.0 /100 WBC     POC Lactate [[id number]]  (Normal) Collected: 01/13/25 2139    Specimen: Blood Updated: 01/13/25 2140     Lactate 1.3 mmol/L      Comment: Serial Number: [id number]:  [id number]       Blood Culture - Blood, Arm, Right [[id number]] Collected: 01/13/25 2132    Specimen: Blood from Arm, Right Updated: 01/13/25 2138             Imaging Results (Last 24 Hours)       Procedure Component Value Units Date/Time    XR Chest 1 View [[id number]] Collected: 01/13/25 2210     Updated: 01/13/25 2213    Narrative:      XR CHEST 1 VW    Date of Exam: 1/13/2025 10:07 PM EST    Indication: Short of breath    Comparison: Noncontrast chest CT performed on January 6, 2020    Findings:  Lung volumes are low. Bilateral perihilar interstitial thickening is visualized. No focal consolidation or  effusion. There is no evidence of pneumothorax. Calcified left hilar lymph nodes are visualized. Cardiac silhouette is enlarged. No acute osseous   abnormality identified.      Impression:      Impression:  Low lung volumes. Bilateral perihilar interstitial thickening may be on the basis of interstitial edema.      Electronically Signed: Dick Forman MD    1/13/2025 10:11 PM EST    Workstation ID: [id number]              Assessment & Plan        This is a 74 y.o. male with:    Active and Resolved Problems  Active Hospital Problems    Diagnosis  POA    **COPD with acute exacerbation [J44.1]  Unknown    COPD exacerbation [J44.1]  Yes    Acute on chronic hypoxic respiratory failure [J96.21]  Unknown    Hyperkalemia [E87.5]  Unknown    Hyperlipidemia [E78.5]  Yes    Chronic obstructive pulmonary disease [J44.9]  Yes    Obstructive sleep apnea syndrome [G47.33]  Yes    Impairment of balance [R26.89]  Yes    Gastroesophageal reflux disease [K21.9]  Yes    Essential hypertension [I10]  Yes    Depressive disorder [F32.A]  Yes    Benign prostatic hyperplasia [N40.0]  Yes    Generalized anxiety disorder [F41.1]  Yes      Resolved Hospital Problems   No resolved problems to display.     COPD exacerbation with acute on chronic hypoxic respiratory failure [3 L baseline]  -Scheduled DuoNebs every 6 hours, prednisone 40 mg daily, azithromycin, Unasyn  -Pulmonary toileting  -Maintain SpO2 greater than 88% in a COPD patient    Lower extremity numbness and weakness with history of falling, likely signs of PAD  -PT/OT  -ABIs lower extremity bilaterally  -Consider vascular surgery consult in a.m.    Hyperkalemia-Lasix 20 mg x 1    STEPHEN versus CKD, unclear at this time  -Continue to trend creatinine, avoid nephrotoxic medications as able.    Chronic pain-continue home Norco, gabapentin, as needed baclofen holding.  Meloxicam in the setting of poor kidney function.    ELVIS-CPAP/BiPAP ordered    GERD-PPI    HTN-restart hypertensive  medication soured fashion    BPH-tamsulosin    SHEILA-SSRI restart    Gout-allopurinol    HLD-statin    VTE Prophylaxis:  Pharmacologic VTE prophylaxis orders are signed & held.          The patient desires to be as follows:    CODE STATUS:    Level Of Support Discussed With: Patient  Code Status (Patient has no pulse and is not breathing): CPR (Attempt to Resuscitate)  Medical Interventions (Patient has pulse or is breathing): Full Support        Regis Collier, who can be contacted at [phone], is the designated person to make medical decisions on the patient's behalf if He is incapable of doing so. This was clarified with patient and/or next of kin on 2025 during the course of this H&P.    Admission Status:  I believe this patient meets Observation status.    Expected Length of Stay: 1-2 days    PDMP and Medication Dispenses via Sidebar reviewed and consistent with patient reported medications.    I discussed the patient's findings and my recommendations with patient.      Signature:     This document has been electronically signed by Jaylan Zee MD on 2025 00:10 [facility]     Electronically signed by Jaylan Zee MD at 25 0010       Operative/Procedure Notes (all)    No notes of this type exist for this encounter.          Physician Progress Notes (last 48 hours)        Juan Balderas MD at 25 0928              [facility] Progress Note      Patient Name: Poncho Dalton .  : 1950  MRN: [id number]  Primary Care Physician:  Ronaldo Alvarez MD  Date of admission: 2025    Subjective     Interval History:     Patient resting comfortably.    Denies any chest pain, nausea vomiting  Dyspnea improving    Review of Systems:   As noted above    Objective     Vitals:   Temp:  [97.5 °F (36.4 °C)-98.2 °F (36.8 °C)] 97.5 °F (36.4 °C)  Heart Rate:  [] 96  Resp:  [16-20] 19  BP: (118-164)/(70-96) 156/77  Flow (L/min) (Oxygen  Therapy):  [3] 3    Intake/Output Summary (Last 24 hours) at 1/17/2025 0923  Last data filed at 1/17/2025 0032  Gross per 24 hour   Intake 1820 ml   Output 350 ml   Net 1470 ml       Physical Exam:      General Appearance: Chronically ill-appearing, NAD  Skin: warm and dry  HEENT: oral mucosa normal, nonicteric sclera  Neck: supple, no JVD  Lungs: Decreased breath sound bases.  Few scattered wheezes bilaterally  Heart: RRR, normal S1 and S2  Abdomen: soft, nontender, nondistended  : no palpable bladder  Extremities: Trace bilateral lower extremities edema with venous stasis changes  Neuro: normal speech and mental status     Scheduled Meds:     allopurinol, 100 mg, Oral, Daily  ampicillin-sulbactam, 3,000 mg, Intravenous, Q6H  aspirin, 81 mg, Oral, Daily  atorvastatin, 10 mg, Oral, Daily  citalopram, 20 mg, Oral, Daily  enoxaparin, 40 mg, Subcutaneous, Daily  gabapentin, 800 mg, Oral, Q8H  isosorbide mononitrate, 30 mg, Oral, Daily  mineral oil-hydrophilic petrolatum, 1 Application, Topical, Daily  pantoprazole, 40 mg, Oral, Q AM  predniSONE, 40 mg, Oral, Daily With Breakfast  sodium chloride, 10 mL, Intravenous, Q12H  tamsulosin, 0.4 mg, Oral, Daily      IV Meds:          Results Reviewed:   I have personally reviewed the results from the time of this admission to 1/17/2025 09:23 EST     Results from last 7 days   Lab Units 01/17/25  0531 01/16/25  0319 01/15/25  0033 01/14/25  0741 01/13/25  2132   SODIUM mmol/L 135* 136 138   < > 136   POTASSIUM mmol/L 4.2 4.8 4.3   < > 5.5*   CHLORIDE mmol/L 97* 100 99   < > 98   CO2 mmol/L 26.4 25.9 26.2   < > 26.9   BUN mg/dL 18 21 35*   < > 29*   CREATININE mg/dL 1.03 1.11 1.53*   < > 1.56*   CALCIUM mg/dL 9.3 9.0 9.1   < > 9.6   BILIRUBIN mg/dL  --   --   --   --  0.3   ALK PHOS U/L  --   --   --   --  110   ALT (SGPT) U/L  --   --   --   --  15   AST (SGOT) U/L  --   --   --   --  32   GLUCOSE mg/dL 105* 110* 148*   < > 82    < > = values in this interval not displayed.  "    Estimated Creatinine Clearance: 70.6 mL/min (by C-G formula based on SCr of 1.03 mg/dL).  Results from last 7 days   Lab Units 25  0531 25  0319 01/15/25  0033   PHOSPHORUS mg/dL 2.4* 3.0 3.6         Results from last 7 days   Lab Units 25  0309 25  0451 01/15/25  0033 25  0741 25  2132   WBC 10*3/mm3 10.32 10.91* 11.40* 6.47 9.94   HEMOGLOBIN g/dL 13.1 12.6* 12.4* 13.1 13.9   PLATELETS 10*3/mm3 347 317 372 388 402           Assessment / Plan     ASSESSMENT:    Acute kidney injury.  Seems to be prerenal in etiology related to COPD exacerbation.  Creatinine decreased to 1.03 Mg/DL.  Electrolytes okay   hyperkalemia.  Mild.  Improved  COPD exacerbation.  Acute on chronic hypoxic respiratory failure.  Patient is on antibiotics and steroids  History of BPH.  On tamsulosin.  No significant obstructive symptoms  Benign essential hypertension.  Pressure well-controlled    PLAN:  If you to restart low-dose Lasix from renal standpoint  I will sign off.  Please call with any questions  Okay to discharge from renal standpoint    Juan Balderas MD  25  09:23 EST    [facility]  [phone]               Electronically signed by Juan Balderas MD at 25 3649       Juana Emmanuel APRN at 25 1156       Attestation signed by Azar Pineda MD at 25 9226    I have reviewed this documentation and agree.                      [facility] MEDICINE SERVICE  DAILY PROGRESS NOTE    NAME: Poncho Dalton Sr.  : 1950  MRN: [id number]      LOS: 1 day     PROVIDER OF SERVICE: KYLEIGH Ocampo    Chief Complaint: COPD with acute exacerbation    Subjective:     Interval History:  History taken from: patient    Patient otherwise doing well.  Reports he is feeling better.  Patient reports he does not want to go to [facility] because \"they gave me COVID\".  Informed patient that his daughter chose [facility] however will have a discussion with her to " determine alternative options.  Spoke with patient daughter over the phone who would like case management to call with a list of other SNF options to make choices from.   has been notified.        Review of Systems:   Review of Systems   Respiratory:  Negative for shortness of breath.    Cardiovascular:  Negative for chest pain.   Neurological:  Negative for dizziness and headaches.       Objective:     Vital Signs  Temp:  [97.4 °F (36.3 °C)-98.2 °F (36.8 °C)] 97.9 °F (36.6 °C)  Heart Rate:  [] 93  Resp:  [13-27] 16  BP: (105-159)/(72-87) 145/87  Flow (L/min) (Oxygen Therapy):  [2-4] 3   Body mass index is 35.51 kg/m².    Physical Exam  Physical Exam  General: 75 yo male, Alert and oriented, obese, no acute distress.  HENT: Normocephalic, normal hearing, moist oral mucosa, no scleral icterus.  Neck: Supple, nontender, no carotid bruits, no JVD, no LAD.  Lungs: nonlabored respiration.  Heart: RRR.  Abdomen: Soft, nontender, nondistended.  Musculoskeletal: Normal range of motion and strength, no tenderness or swelling.  Skin: Skin is warm, dry and pink, no rashes or lesions.  Psychiatric: Cooperative, appropriate mood and affect.       Diagnostic Data    Results from last 7 days   Lab Units 01/16/25  0451 01/16/25  0319 01/14/25  0741 01/13/25  2132   WBC 10*3/mm3 10.91*  --    < > 9.94   HEMOGLOBIN g/dL 12.6*  --    < > 13.9   HEMATOCRIT % 39.3  --    < > 42.6   PLATELETS 10*3/mm3 317  --    < > 402   GLUCOSE mg/dL  --  110*   < > 82   CREATININE mg/dL  --  1.11   < > 1.56*   BUN mg/dL  --  21   < > 29*   SODIUM mmol/L  --  136   < > 136   POTASSIUM mmol/L  --  4.8   < > 5.5*   AST (SGOT) U/L  --   --   --  32   ALT (SGPT) U/L  --   --   --  15   ALK PHOS U/L  --   --   --  110   BILIRUBIN mg/dL  --   --   --  0.3   ANION GAP mmol/L  --  10.1   < > 11.1    < > = values in this interval not displayed.       US Renal Bilateral    Result Date: 1/14/2025  Impression: Symmetric mild renal atrophy and  "renal cortical thinning, otherwise no acute finding. Electronically Signed: Quan Escalante MD  1/14/2025 2:49 PM EST  Workstation ID: [id number]       I reviewed the patient's new clinical results.    Assessment/Plan:     Active and Resolved Problems  Active Hospital Problems    Diagnosis  POA    **COPD with acute exacerbation [J44.1]  Unknown    COPD exacerbation [J44.1]  Yes    Acute on chronic respiratory failure with hypoxia [J96.21]  Yes    Acute on chronic hypoxic respiratory failure [J96.21]  Unknown    Hyperkalemia [E87.5]  Unknown    Hyperlipidemia [E78.5]  Yes    Chronic obstructive pulmonary disease [J44.9]  Yes    Obstructive sleep apnea syndrome [G47.33]  Yes    Impairment of balance [R26.89]  Yes    Gastroesophageal reflux disease [K21.9]  Yes    Essential hypertension [I10]  Yes    Depressive disorder [F32.A]  Yes    Benign prostatic hyperplasia [N40.0]  Yes    Generalized anxiety disorder [F41.1]  Yes      Resolved Hospital Problems   No resolved problems to display.       COPD exacerbation with acute on chronic hypoxic respiratory failure [3 L baseline]  -Chest x-ray showed bilateral perihilar interstitial thickening may be on the basis of interstitial edema.  TTE and BNP ordered  -Scheduled DuoNebs every 6 hours, prednisone 40 mg daily, azithromycin, Unasyn  -Pulmonary toileting  -Maintain SpO2 greater than 88% in a COPD patient     Lower extremity numbness and weakness with history of falling, likely signs of PAD  -PT/OT recommending SNF, patient and daughter Regis agreeable to this disposition  -ABIs lower extremity bilaterally moderately reduced  -Vascular surgery consulted, see note. Per Dr. Espinal, \"Recommend keeping lower extremities warm and protected, continue wound care, and will arrange outpatient follow up in 1 month\".      Hyperkalemia- resolved  -Lasix 20 mg x 1 done 1/14/2025   -K+ today 4.8     STEPHEN   -Continue to trend creatinine, avoid nephrotoxic medications as able.  -Renal " "ultrasound showed \"Symmetric mild renal atrophy and renal cortical thinning\"  -Nephrology consulted and following  -Cr has trended down to 1.11 from 1.53     Chronic pain-continue home Norco, gabapentin, as needed baclofen holding.  Meloxicam in the setting of poor kidney function.     ELVIS-CPAP/BiPAP ordered     GERD-PPI     HTN-restart hypertensive medication      BPH-tamsulosin     SHEILA-SSRI restart     Gout-allopurinol     HLD-statin       VTE Prophylaxis:  Pharmacologic VTE prophylaxis orders are present.             Disposition Planning:     Barriers to Discharge: Placement, continued care  Anticipated Date of Discharge: 2025  Place of Discharge: SNF      Time: 35 minutes     Code Status and Medical Interventions: CPR (Attempt to Resuscitate); Full Support   Ordered at: 25 0000     Level Of Support Discussed With:    Patient     Code Status (Patient has no pulse and is not breathing):    CPR (Attempt to Resuscitate)     Medical Interventions (Patient has pulse or is breathing):    Full Support       Signature: Electronically signed by KYLEIGH Ocampo, 25, 11:57 EST.  [facility] Hospitalist Team      Electronically signed by Azar Pineda MD at 25 1316       Juan Balderas MD at 25 0929              [facility] Progress Note      Patient Name: Poncho Dalton Sr.  : 1950  MRN: [id number]  Primary Care Physician:  Ronaldo Alvarez MD  Date of admission: 2025    Subjective     Interval History:     Patient resting comfortably.    Denies any chest pain, nausea vomiting  Dyspnea improving    Review of Systems:   As noted above    Objective     Vitals:   Temp:  [97.4 °F (36.3 °C)-98.5 °F (36.9 °C)] 97.9 °F (36.6 °C)  Heart Rate:  [] 93  Resp:  [13-27] 16  BP: (105-159)/(72-87) 145/87  Flow (L/min) (Oxygen Therapy):  [2-4] 3    Intake/Output Summary (Last 24 hours) at 2025 0929  Last data filed at 2025 0739  Gross per 24 " hour   Intake 1400 ml   Output --   Net 1400 ml       Physical Exam:      General Appearance: Chronically ill-appearing, NAD  Skin: warm and dry  HEENT: oral mucosa normal, nonicteric sclera  Neck: supple, no JVD  Lungs: Decreased breath sound bases.  Few scattered wheezes bilaterally  Heart: RRR, normal S1 and S2  Abdomen: soft, nontender, nondistended  : no palpable bladder  Extremities: Trace bilateral lower extremities edema with venous stasis changes  Neuro: normal speech and mental status     Scheduled Meds:     allopurinol, 100 mg, Oral, Daily  ampicillin-sulbactam, 3,000 mg, Intravenous, Q6H  aspirin, 81 mg, Oral, Daily  atorvastatin, 10 mg, Oral, Daily  azithromycin, 500 mg, Oral, Q24H  citalopram, 20 mg, Oral, Daily  enoxaparin, 40 mg, Subcutaneous, Daily  gabapentin, 800 mg, Oral, Q8H  ipratropium-albuterol, 3 mL, Nebulization, Q6H - RT  isosorbide mononitrate, 30 mg, Oral, Daily  mineral oil-hydrophilic petrolatum, 1 Application, Topical, Daily  pantoprazole, 40 mg, Oral, Q AM  predniSONE, 40 mg, Oral, Daily With Breakfast  sodium chloride, 10 mL, Intravenous, Q12H  tamsulosin, 0.4 mg, Oral, Daily      IV Meds:   sodium chloride, 50 mL/hr, Last Rate: 50 mL/hr (01/15/25 1509)        Results Reviewed:   I have personally reviewed the results from the time of this admission to 1/16/2025 09:29 EST     Results from last 7 days   Lab Units 01/16/25  0319 01/15/25  0033 01/14/25  0741 01/13/25  2132   SODIUM mmol/L 136 138 135* 136   POTASSIUM mmol/L 4.8 4.3 5.1 5.5*   CHLORIDE mmol/L 100 99 99 98   CO2 mmol/L 25.9 26.2 23.8 26.9   BUN mg/dL 21 35* 29* 29*   CREATININE mg/dL 1.11 1.53* 1.60* 1.56*   CALCIUM mg/dL 9.0 9.1 9.2 9.6   BILIRUBIN mg/dL  --   --   --  0.3   ALK PHOS U/L  --   --   --  110   ALT (SGPT) U/L  --   --   --  15   AST (SGOT) U/L  --   --   --  32   GLUCOSE mg/dL 110* 148* 123* 82     Estimated Creatinine Clearance: 65.6 mL/min (by C-G formula based on SCr of 1.11 mg/dL).  Results from  last 7 days   Lab Units 25  0319 01/15/25  0033   PHOSPHORUS mg/dL 3.0 3.6         Results from last 7 days   Lab Units 25  0451 01/15/25  0033 25  0741 25  2132   WBC 10*3/mm3 10.91* 11.40* 6.47 9.94   HEMOGLOBIN g/dL 12.6* 12.4* 13.1 13.9   PLATELETS 10*3/mm3 317 372 388 402           Assessment / Plan     ASSESSMENT:    Acute kidney injury.  Seems to be prerenal in etiology related to COPD exacerbation.  Creatinine decreased to 1.1 Mg/DL.  Electrolytes okay   hyperkalemia.  Mild.  Improved  COPD exacerbation.  Acute on chronic hypoxic respiratory failure.  Patient is on antibiotics and steroids  History of BPH.  On tamsulosin.  No significant obstructive symptoms  Benign essential hypertension.  Pressure well-controlled    PLAN:  Discontinue IV fluids  Keep off NSAIDs  Okay to discharge from renal standpoint    Juan Balderas MD  25  09:29 EST    [facility]  [phone]               Electronically signed by Juan Balderas MD at 25 0930       Juan Balderas MD at 01/15/25 1203              [facility] Progress Note      Patient Name: Poncho Dalton Sr.  : 1950  MRN: [id number]  Primary Care Physician:  Ronaldo Alvarez MD  Date of admission: 2025    Subjective     Interval History:     Patient resting comfortably.  Feeling little better  Dyspnea improving    Review of Systems:   As noted above    Objective     Vitals:   Temp:  [97.3 °F (36.3 °C)-98.9 °F (37.2 °C)] 98.5 °F (36.9 °C)  Heart Rate:  [] 96  Resp:  [14-20] 20  BP: (106-143)/(62-88) 128/79  Flow (L/min) (Oxygen Therapy):  [2-4] 2    Intake/Output Summary (Last 24 hours) at 1/15/2025 1203  Last data filed at 1/15/2025 1130  Gross per 24 hour   Intake 680 ml   Output --   Net 680 ml       Physical Exam:      General Appearance: Chronically ill-appearing, NAD  Skin: warm and dry  HEENT: oral mucosa normal, nonicteric sclera  Neck: supple, no JVD  Lungs:  Decreased breath sound bases.  Few scattered wheezes bilaterally  Heart: RRR, normal S1 and S2  Abdomen: soft, nontender, nondistended  : no palpable bladder  Extremities: Trace bilateral lower extremities edema with venous stasis changes  Neuro: normal speech and mental status     Scheduled Meds:     allopurinol, 100 mg, Oral, Daily  ampicillin-sulbactam, 3,000 mg, Intravenous, Q6H  aspirin, 81 mg, Oral, Daily  atorvastatin, 10 mg, Oral, Daily  azithromycin, 500 mg, Intravenous, Daily  citalopram, 20 mg, Oral, Daily  enoxaparin, 40 mg, Subcutaneous, Daily  gabapentin, 800 mg, Oral, Q8H  ipratropium-albuterol, 3 mL, Nebulization, Q6H - RT  isosorbide mononitrate, 30 mg, Oral, Daily  pantoprazole, 40 mg, Oral, Q AM  predniSONE, 40 mg, Oral, Daily With Breakfast  sodium chloride, 10 mL, Intravenous, Q12H  tamsulosin, 0.4 mg, Oral, Daily      IV Meds:        Results Reviewed:   I have personally reviewed the results from the time of this admission to 1/15/2025 12:03 EST     Results from last 7 days   Lab Units 01/15/25  0033 01/14/25  0741 01/13/25  2132   SODIUM mmol/L 138 135* 136   POTASSIUM mmol/L 4.3 5.1 5.5*   CHLORIDE mmol/L 99 99 98   CO2 mmol/L 26.2 23.8 26.9   BUN mg/dL 35* 29* 29*   CREATININE mg/dL 1.53* 1.60* 1.56*   CALCIUM mg/dL 9.1 9.2 9.6   BILIRUBIN mg/dL  --   --  0.3   ALK PHOS U/L  --   --  110   ALT (SGPT) U/L  --   --  15   AST (SGOT) U/L  --   --  32   GLUCOSE mg/dL 148* 123* 82     Estimated Creatinine Clearance: 47.6 mL/min (A) (by C-G formula based on SCr of 1.53 mg/dL (H)).  Results from last 7 days   Lab Units 01/15/25  0033   PHOSPHORUS mg/dL 3.6         Results from last 7 days   Lab Units 01/15/25  0033 01/14/25  0741 01/13/25  2132   WBC 10*3/mm3 11.40* 6.47 9.94   HEMOGLOBIN g/dL 12.4* 13.1 13.9   PLATELETS 10*3/mm3 372 388 402           Assessment / Plan     ASSESSMENT:    Acute kidney injury.  Seems to be prerenal in etiology related to COPD exacerbation.  Creatinine slightly  better at 1.5 Mg/DL.  Mucous membranes dry has peripheral edema.  Hyperkalemia.  Mild.  Improved  COPD exacerbation.  Acute on chronic hypoxic respiratory failure.  Patient is on antibiotics and steroids  History of BPH.  On tamsulosin.  No significant obstructive symptoms  Benign essential hypertension.  Pressure well-controlled    PLAN:  Gentle IV hydration  Keep off NSAIDs  Repeat labs in a.m.    Juan Balderas MD  01/15/25  12:03 EST    [facility]  [phone]               Electronically signed by Juan Balderas MD at 01/15/25 1204       Juana Emmanuel APRN at 01/15/25 1007       Attestation signed by Azar Pineda MD at 25 1316    I have reviewed this documentation and agree.                      [facility] MEDICINE SERVICE  DAILY PROGRESS NOTE    NAME: Poncho Dalton Sr.  : 1950  MRN: [id number]      LOS: 0 days     PROVIDER OF SERVICE: KYLEIGH Ocampo    Chief Complaint: COPD with acute exacerbation    Subjective:     Interval History:  History taken from: patient    Patient otherwise doing well.  Reports he is feeling better.  Asked that we speak with his daughter over the phone, Regis.  Lengthy discussion with patient regarding disposition to a skilled nursing facility, patient agreeable would like to discuss with patient daughter.  Spoke with patient daughter over the phone who desired [facility] to go to rehab at [facility] if possible.  Inform daughter I would discussed with case management however with physical therapy recommendation of skilled nursing facility unsure if pain is possible.  Patient daughter next choice would be [facility].  Case management informed of this conversation and placement is in progress.        Review of Systems:   Review of Systems   Respiratory:  Negative for shortness of breath.    Cardiovascular:  Negative for chest pain.   Neurological:  Negative for dizziness and headaches.       Objective:     Vital Signs  Temp:  [97 °F (36.1 °C)-98.9  °F (37.2 °C)] 98.1 °F (36.7 °C)  Heart Rate:  [] 86  Resp:  [12-19] 14  BP: (106-143)/() 143/88  Flow (L/min) (Oxygen Therapy):  [4] 4   Body mass index is 35.51 kg/m².    Physical Exam  Physical Exam  General: 73 yo male, Alert and oriented, obese, no acute distress.  HENT: Normocephalic, normal hearing, moist oral mucosa, no scleral icterus.  Neck: Supple, nontender, no carotid bruits, no JVD, no LAD.  Lungs: nonlabored respiration.  Heart: RRR.  Abdomen: Soft, nontender, nondistended.  Musculoskeletal: Normal range of motion and strength, no tenderness or swelling.  Skin: Skin is warm, dry and pink, no rashes or lesions.  Psychiatric: Cooperative, appropriate mood and affect.       Diagnostic Data    Results from last 7 days   Lab Units 01/15/25  0033 01/14/25  0741 01/13/25  2132   WBC 10*3/mm3 11.40*   < > 9.94   HEMOGLOBIN g/dL 12.4*   < > 13.9   HEMATOCRIT % 37.1*   < > 42.6   PLATELETS 10*3/mm3 372   < > 402   GLUCOSE mg/dL 148*   < > 82   CREATININE mg/dL 1.53*   < > 1.56*   BUN mg/dL 35*   < > 29*   SODIUM mmol/L 138   < > 136   POTASSIUM mmol/L 4.3   < > 5.5*   AST (SGOT) U/L  --   --  32   ALT (SGPT) U/L  --   --  15   ALK PHOS U/L  --   --  110   BILIRUBIN mg/dL  --   --  0.3   ANION GAP mmol/L 12.8   < > 11.1    < > = values in this interval not displayed.       US Renal Bilateral    Result Date: 1/14/2025  Impression: Symmetric mild renal atrophy and renal cortical thinning, otherwise no acute finding. Electronically Signed: Quan Escalante MD  1/14/2025 2:49 PM EST  Workstation ID: [id number]    XR Chest 1 View    Result Date: 1/13/2025  Impression: Low lung volumes. Bilateral perihilar interstitial thickening may be on the basis of interstitial edema. Electronically Signed: Dick Forman MD  1/13/2025 10:11 PM EST  Workstation ID: [id number]       I reviewed the patient's new clinical results.    Assessment/Plan:     Active and Resolved Problems  Active Hospital Problems    Diagnosis  POA  "   **COPD with acute exacerbation [J44.1]  Unknown    COPD exacerbation [J44.1]  Yes    Acute on chronic respiratory failure with hypoxia [J96.21]  Yes    Acute on chronic hypoxic respiratory failure [J96.21]  Unknown    Hyperkalemia [E87.5]  Unknown    Hyperlipidemia [E78.5]  Yes    Chronic obstructive pulmonary disease [J44.9]  Yes    Obstructive sleep apnea syndrome [G47.33]  Yes    Impairment of balance [R26.89]  Yes    Gastroesophageal reflux disease [K21.9]  Yes    Essential hypertension [I10]  Yes    Depressive disorder [F32.A]  Yes    Benign prostatic hyperplasia [N40.0]  Yes    Generalized anxiety disorder [F41.1]  Yes      Resolved Hospital Problems   No resolved problems to display.       COPD exacerbation with acute on chronic hypoxic respiratory failure [3 L baseline]  -Chest x-ray showed bilateral perihilar interstitial thickening may be on the basis of interstitial edema.  TTE and BNP ordered  -Scheduled DuoNebs every 6 hours, prednisone 40 mg daily, azithromycin, Unasyn  -Pulmonary toileting  -Maintain SpO2 greater than 88% in a COPD patient     Lower extremity numbness and weakness with history of falling, likely signs of PAD  -PT/OT recommending SNF, patient and daughter Regis agreeable to this disposition  -ABIs lower extremity bilaterally moderately reduced  -Vascular surgery consulted, see note. Per Dr. Espinal, \"Recommend keeping lower extremities warm and protected, continue wound care, and will arrange outpatient follow up in 1 month\".      Hyperkalemia- resolved  -Lasix 20 mg x 1 done yesterday   -K+ today 4.3     STEPHEN versus CKD, unclear at this time  -Continue to trend creatinine, avoid nephrotoxic medications as able.  -Renal ultrasound showed \"Symmetric mild renal atrophy and renal cortical thinning\"  -Nephrology consulted and following     Chronic pain-continue home Norco, gabapentin, as needed baclofen holding.  Meloxicam in the setting of poor kidney function.     ELVIS-CPAP/BiPAP ordered   "   GERD-PPI     HTN-restart hypertensive medication      BPH-tamsulosin     SHEILA-SSRI restart     Gout-allopurinol     HLD-statin       VTE Prophylaxis:  Pharmacologic VTE prophylaxis orders are present.             Disposition Planning:     Barriers to Discharge: Placement, continued care  Anticipated Date of Discharge: 1-  Place of Discharge: SNF      Time: 35 minutes     Code Status and Medical Interventions: CPR (Attempt to Resuscitate); Full Support   Ordered at: 01/14/25 0000     Level Of Support Discussed With:    Patient     Code Status (Patient has no pulse and is not breathing):    CPR (Attempt to Resuscitate)     Medical Interventions (Patient has pulse or is breathing):    Full Support       Signature: Electronically signed by KYLEIGH Ocampo, 01/15/25, 10:07 EST.  [facility] Hospitalist Team      Electronically signed by Azar Pineda MD at 01/16/25 1316       Consult Notes (last 48 hours)  Notes from 01/15/25 0935 through 01/17/25 0935   No notes of this type exist for this encounter.          Nutrition Notes (most recent note)        Vania Jamison RD at 01/15/25 0931          Nutrition Services    Patient Name: Poncho Dalton Sr.  YOB: 1950  MRN: [id number]  Admission date: 1/13/2025    RD to add Boost Original BID (Provides 480 kcals, 20 g protein if consumed)     NUTRITION SCREENING      Trending Narrative: 1/15: Nutrition screening r/t multiple wounds documented. Pt presented to ED on 1/13 via EMS with complaints of increasing SOB over the last couple of days. Pt has a history of COPD with 3L O2 at baseline. Pt admitted r/t COPD exacerbation with increased O2 demands. Pt continues on 5L O2. Pt is able to feed himself with some assistance. Pt has multiple documented wounds. Pt is not appropriate for [facility] at this time due to elevated renal function labs. RD to add ONS to supplement po intake.        PO Diet: Diet: Cardiac; Healthy Heart (2-3 Na+);  "Fluid Consistency: Thin (IDDSI 0)   PO Supplements:    Trending PO Intake:  1/15: No meals documented since admission (x 2 days)        Nutritionally-Pertinent Medications RDN Reviewed, C/W clinical course         Labs (reviewed below): Reviewed. Management per attending.      Results from last 7 days   Lab Units 01/15/25  0033 01/14/25  0741 01/13/25  2132   SODIUM mmol/L 138 135* 136   POTASSIUM mmol/L 4.3 5.1 5.5*   CHLORIDE mmol/L 99 99 98   CO2 mmol/L 26.2 23.8 26.9   BUN mg/dL 35* 29* 29*   CREATININE mg/dL 1.53* 1.60* 1.56*   CALCIUM mg/dL 9.1 9.2 9.6   BILIRUBIN mg/dL  --   --  0.3   ALK PHOS U/L  --   --  110   ALT (SGPT) U/L  --   --  15   AST (SGOT) U/L  --   --  32   GLUCOSE mg/dL 148* 123* 82     Results from last 7 days   Lab Units 01/15/25  0033   PHOSPHORUS mg/dL 3.6   HEMOGLOBIN g/dL 12.4*   HEMATOCRIT % 37.1*     No results found for: \"HGBA1C\"       GI Function:  No BM documented since admission (x last 2 days)        Skin: Stage 2 - R anterior ankle  Stage 2 - L anterior ankle  MASD - gluteal  Stage 1 - L posterior heel  Multiple arm wounds  Stage 1 - R heel        Weight Review: Estimated body mass index is 35.51 kg/m² as calculated from the following:    Height as of this encounter: 167.6 cm (66\").    Weight as of this encounter: 99.8 kg (220 lb).    Comment:   1/15: 220#  No recent weight history available    Wt Readings from Last 30 Encounters:   01/15/25 0007 99.8 kg (220 lb)   01/14/25 0141 100 kg (220 lb 7.4 oz)   10/19/23 1203 108 kg (238 lb)   07/29/22 0904 108 kg (237 lb)   07/29/22 1151 108 kg (237 lb)   07/08/22 1029 108 kg (237 lb)          --  Protein Requirements    EST Needs, Method, Wt used  g PRO (1.2-2.0 g/kg IBW 63.8 kg)            Nutrition Problem Statement: Increased nutrient needs (kcal/protein) related to healing as evidenced by multiple documented wounds.             Nutrition Intervention: RD to add Boost Original BID (Provides 480 kcals, 20 g protein if consumed) "     Continue to encourage good po intake.           Monitoring/Evaluation Per protocol, I&O, PO intake, Supplement intake, Pertinent labs, Weight, Skin status, GI status, Symptoms, Swallow function, Hemodynamic stability            RD to follow up per protocol.    Electronically signed by:  Vania Jamison RD  01/15/25 09:31 EST        Electronically signed by Vania Jamison RD at 01/15/25 0946       Speech Language Pathology Notes (most recent note)    No notes exist for this encounter.       Jeannette Mera, PT   Physical Therapist  Specialty:  Physical Therapy  Therapy Evaluation     Signed  Date of Service:  25 1440  Creation Time:  25     Signed        Expand All Collapse All  Patient Name: Poncho Dalton .                        : 1950                      MRN: [id number]                              Today's Date: 2025                                   Admit Date: 2025                        Visit Dx:   Visit Diagnosis       ICD-10-CM ICD-9-CM   1. Acute respiratory distress  R06.03 518.82   2. Chronic obstructive pulmonary disease with acute exacerbation  J44.1 491.21         Problem List       Patient Active Problem List   Diagnosis    Benign prostatic hyperplasia    Chronic obstructive pulmonary disease    Depressive disorder    Essential hypertension    Fatigue    Gastroesophageal reflux disease    Generalized anxiety disorder    Gout    Hyperlipidemia    Impairment of balance    Obstructive sleep apnea syndrome    COPD with acute exacerbation    Acute on chronic hypoxic respiratory failure    Hyperkalemia    COPD exacerbation    Acute on chronic respiratory failure with hypoxia         Medical History   History reviewed. No pertinent past medical history.     Surgical History         Past Surgical History:   Procedure Laterality Date    CARDIAC CATHETERIZATION               General Information         Row Name 25 1434                  Physical Therapy Time and Intention     Document Type evaluation  -AD       Mode of Treatment physical therapy  -AD          Row Name 01/14/25 1433                 General Information     Patient Profile Reviewed yes  -AD       Prior Level of Function min assist:;bed mobility;ADL's  assist from daughter at home  -AD       Existing Precautions/Restrictions fall;oxygen therapy device and L/min  -AD       Barriers to Rehab medically complex;previous functional deficit  -AD          Row Name 01/14/25 1433                 Living Environment     People in Home child(kodi), adult  -AD          Row Name 01/14/25 1433                 Home Main Entrance     Number of Stairs, Main Entrance none  -AD          Row Name 01/14/25 1433                 Safety Issues/Impairments Affecting Functional Mobility     Impairments Affecting Function (Mobility) balance;cognition;coordination;endurance/activity tolerance;pain;strength;range of motion (ROM);postural/trunk control  -AD                       User Key  (r) = Recorded By, (t) = Taken By, (c) = Cosigned By        Initials Name Provider Type     AD Jeannette Mera PT Physical Therapist                            Mobility         Row Name 01/14/25 1434                 Bed Mobility     Bed Mobility bed mobility (all) activities  -AD       All Activities, [place] (Bed Mobility) moderate assist (50% patient effort)  -AD       Assistive Device (Bed Mobility) bed rails;head of bed elevated;repositioning sheet  -AD          Row Name 01/14/25 1434                 Sit-Stand Transfer     Sit-Stand [place] (Transfers) moderate assist (50% patient effort);2 person assist  -AD          Row Name 01/14/25 1434                 Gait/Stairs (Locomotion)     [place] Level (Gait) unable to assess  -AD       Patient was able to Ambulate no, other medical factors prevent ambulation  -AD       Reason Patient was unable to Ambulate Excessive Weakness  -AD                       User  Key  (r) = Recorded By, (t) = Taken By, (c) = Cosigned By        Initials Name Provider Type     Jeannette Danielle PT Physical Therapist                            Obj/Interventions         Row Name 01/14/25 1434                 Range of Motion Comprehensive     Comment, General Range of Motion hip ROM 50% limited, knee and ankle WFL  -AD          Row Name 01/14/25 1434                 Strength Comprehensive (MMT)     Comment, General Manual Muscle Testing (MMT) Assessment 3+/5 hip flex, 4-/5 knee ext  -AD                       User Key  (r) = Recorded By, (t) = Taken By, (c) = Cosigned By        Initials Name Provider Type     Jeannette Danielle, PT Physical Therapist                            Goals/Plan         Row Name 01/14/25 1439                 Bed Mobility Goal 1 (PT)     Activity/Assistive Device (Bed Mobility Goal 1, PT) bed mobility activities, all  -AD       [place] Level/Cues Needed (Bed Mobility Goal 1, PT) contact guard required  -AD       Time Frame (Bed Mobility Goal 1, PT) long term goal (LTG)  -AD          Row Name 01/14/25 1439                 Transfer Goal 1 (PT)     Activity/Assistive Device (Transfer Goal 1, PT) transfers, all  -AD       [place] Level/Cues Needed (Transfer Goal 1, PT) contact guard required  -AD       Time Frame (Transfer Goal 1, PT) long term goal (LTG)  -AD          Row Name 01/14/25 1439                 Gait Training Goal 1 (PT)     Activity/Assistive Device (Gait Training Goal 1, PT) gait (walking locomotion);assistive device use  -AD       [place] Level (Gait Training Goal 1, PT) minimum assist (75% or more patient effort)  -AD       Distance (Gait Training Goal 1, PT) 20  -AD       Time Frame (Gait Training Goal 1, PT) long term goal (LTG)  -AD          Row Name 01/14/25 1439                 Therapy Assessment/Plan (PT)     Planned Therapy Interventions (PT) balance training;bed mobility training;gait training;home exercise program;patient/family  education;postural re-education;transfer training;stretching;strengthening;ROM (range of motion);neuromuscular re-education  -AD                    User Key  (r) = Recorded By, (t) = Taken By, (c) = Cosigned By        Initials Name Provider Type     Jeannette Danielle, RAUL Physical Therapist                         Clinical Impression         Row Name 01/14/25 1435                 Pain     Pretreatment Pain Rating 5/10  -AD       Posttreatment Pain Rating 5/10  -AD       Pain Location buttock  -AD          Row Name 01/14/25 1435                 Plan of Care Review     Plan of Care Reviewed With patient  -AD       Progress no change  -AD       Outcome Evaluation 74 y.o. male with a CMH of COPD, chonic 3L o2 at home who presented to [facility] on 1/13/2025 with increasing shortness of breath over the last couple days. Admitted for acute COPD exacerbation and acute on chronic hypoxic respiratory failure. Also having LE numbness and weakness possibly from PAD.  Pt lives with his daughter and son in law in a 0STE home who help with ADLs, and he uses a rollator for household ambulation. He uses 3LO2 at home. Today he was able to perform bed mobility with modAx2, and modA x2 for sit<>Stand. He was not able to ambulate d/t significant weakness. Recommend SNF at discharge as he is at a high falls risk and is significantly below his baseline function.  -AD          Row Name 01/14/25 1431                 Therapy Assessment/Plan (PT)     Patient/Family Therapy Goals Statement (PT) get better  -AD       Rehab Potential (PT) good  -AD       Criteria for Skilled Interventions Met (PT) yes;skilled treatment is necessary  -AD       Therapy Frequency (PT) 3 times/wk  -AD                       User Key  (r) = Recorded By, (t) = Taken By, (c) = Cosigned By        Initials Name Provider Type     Jeannette Danielle, RAUL Physical Therapist                            Outcome Measures         Row Name 01/14/25 0800                  How much help from another person do you currently need...     Turning from your back to your side while in flat bed without using bedrails? 2  -TC       Moving from lying on back to sitting on the side of a flat bed without bedrails? 2  -TC       Moving to and from a bed to a chair (including a wheelchair)? 2  -TC       Standing up from a chair using your arms (e.g., wheelchair, bedside chair)? 2  -TC       Climbing 3-5 steps with a railing? 2  -TC       To walk in hospital room? 2  -TC       AM-PAC 6 Clicks Score (PT) 12  -TC                       User Key  (r) = Recorded By, (t) = Taken By, (c) = Cosigned By        Initials Name Provider Type     TC Mike Saez RN Registered Nurse                          Physical Therapy Education            Title: PT OT SLP Therapies (In Progress)         Topic: Physical Therapy (In Progress)         Point: Mobility training (Done)         Learning Progress Summary             Patient Acceptance, E, VU by AD at 1/14/2025 7913                            Point: Home exercise program (Not Started)         Learner Progress:  Not documented in this visit.                  Point: Body mechanics (Not Started)         Learner Progress:  Not documented in this visit.                  Point: Precautions (Not Started)         Learner Progress:  Not documented in this visit.                                      User Key         Initials Effective Dates Name Provider Type Discipline     AD 06/03/24 -  Jeannette Mera, RAUL Physical Therapist PT                          PT Recommendation and Plan  Planned Therapy Interventions (PT): balance training, bed mobility training, gait training, home exercise program, patient/family education, postural re-education, transfer training, stretching, strengthening, ROM (range of motion), neuromuscular re-education  Progress: no change  Outcome Evaluation: 74 y.o. male with a CMH of COPD, chonic 3L o2 at home who presented to [facility]  on 1/13/[facility]25 with increasing shortness of breath over the last couple days. Admitted for acute COPD exacerbation and acute on chronic hypoxic respiratory failure. Also having LE numbness and weakness possibly from PAD.  Pt lives with his daughter and son in law in a 0STE home who help with ADLs, and he uses a rollator for household ambulation. He uses 3LO2 at home. Today he was able to perform bed mobility with modAx2, and modA x2 for sit<>Stand. He was not able to ambulate d/t significant weakness. Recommend SNF at discharge as he is at a high falls risk and is significantly below his baseline function.      Time Calculation:   PT Evaluation Complexity  History, PT Evaluation Complexity: 1-2 personal factors and/or comorbidities  Examination of Body Systems (PT Eval Complexity): 1-2 elements  Clinical Presentation (PT Evaluation Complexity): stable  Clinical Decision Making (PT Evaluation Complexity): low complexity  Overall Complexity (PT Evaluation Complexity): low complexity       PT Charges         Row Name 01/14/25 1432                       Time Calculation     Start Time 1000  -AD         Stop Time 1029  -AD         Time Calculation (min) 29 min  -AD         PT Received On 01/14/25  -AD         PT - Next Appointment 01/16/25  -AD         PT Goal Re-Cert Due Date 01/28/25  -AD                 Time Calculation- PT     Total Timed Code Minutes- PT 0 minute(s)  -AD                      User Key  (r) = Recorded By, (t) = Taken By, (c) = Cosigned By        Initials Name Provider Type     AD Jeannette Mera PT Physical Therapist                       Therapy Charges for Today         Code Description Service Date Service Provider Modifiers Qty     10762808106 HC PT EVAL LOW COMPLEXITY 4 1/14/2025 Jeannette Mera PT GP 1                PT G-Codes  AM-PAC 6 Clicks Score (PT): 12  PT Discharge Summary  Anticipated Discharge Disposition (PT): skilled nursing facility     Jeannette Mera PT                "1/14/2025                                    Coretta Charles, PT   Physical Therapist  Physical Therapy  Therapy Treatment Note     Signed  Date of Service:  01/16/25 1141  Creation Time:  01/16/25 1141     Signed        Subjective: Pt agreeable to therapeutic plan of care.     Objective:      Precautions - HFR     Bed mobility - Mod-A and Assist x 2  Transfers - Mod-A and Assist x 2 with Arleth Stedy transfer device for 3 sit to stands and bed to recliner transfer     Vitals: WNL with O2 at 3 L     Pain: 0 VAS   Location: N/A  Intervention for pain: N/A     Education: Provided education on the importance of mobility in the acute care setting, Verbal/Tactile Cues, Transfer Training, and Energy conservation strategies     Assessment: Poncho Dalton Sr. presents with functional mobility impairments which indicate the need for skilled intervention. Pt A and O x 4 and eager to get out of bed. Pt was cued for use of Sera Stedy and pt was able to stand with mod assist x 2. Pt able to unload each foot as needed for placement on the foot pad. PT cued pt for more erect posture and anterior weight shift in standing. Pt sat at edge of bed ~ 12 minutes with CGA of 1 in unsupported position. PT assisted pt with charging his phone and computer and he was appreciative of all help offered. Patient is a high risk of injurious falls and unsafe to return to prior living environment at this time.   Tolerating session today without incident. Will continue to follow and progress as tolerated.      Plan/Recommendations:   If medically appropriate, Moderate Intensity Therapy recommended post-acute care. This is recommended as therapy feels the patient would require 3-4 days per week and wouldn't tolerate \"3 hour daily\" rehab intensity. SNF would be the preferred choice. If the patient does not agree to SNF, arrange HH or OP depending on home bound status. If patient is medically complex, consider LTACH. Pt requires no DME at discharge.    "   Pt desires Skilled Rehab placement at discharge. Pt cooperative; agreeable to therapeutic recommendations and plan of care.            Basic Mobility 6-click:  Rollin = Total, A lot = 2, A little = 3; 4 = None  Supine>Sit:                      1 = Total, A lot = 2, A little = 3; 4 = None   Sit>Stand with arms:       1 = Total, A lot = 2, A little = 3; 4 = None  Bed>Chair:                      1 = Total, A lot = 2, A little = 3; 4 = None  Ambulate in room:           1 = Total, A lot = 2, A little = 3; 4 = None  3-5 Steps with railin = Total, A lot = 2, A little = 3; 4 = None  Score: 10     Modified [place]: N/A = No pre-op stroke/TIA     Post-Tx Position: Up in Chair, Alarms activated, and Call light and personal items within reach  PPE: gloves     Therapy Charges for Today         Code Description Service Date Service Provider Modifiers Qty     38279341449 HC PT THERAPEUTIC ACT EA 15 MIN 2025 Coretta Charles, PT GP 2     92701156176 HC PT NEUROMUSC RE EDUCATION EA 15 MIN 2025 Coretta Charles, PT GP 1               PT Charges         Row Name 25 1140 25 1137                  Time Calculation     Start Time -- 1022  -BR       Stop Time -- 1101  -BR       Time Calculation (min) -- 39 min  -BR       PT Received On 25  -BR 25  -BR       PT - Next Appointment -- 25  -BR               Time Calculation- PT     Total Timed Code Minutes- PT -- 39 minute(s)  -BR                    User Key  (r) = Recorded By, (t) = Taken By, (c) = Cosigned By        Initials Name Provider Type     BR Coretta Charles PT Physical Therapist                                     Francis De Guzman OT   Occupational Therapist  Specialty:  Occupational Therapy  Therapy Treatment Note     Signed  Date of Service:  25 115  Creation Time:  25     Signed        Subjective: Pt agreeable to therapeutic plan of care.  Cognition: oriented to Person, Place,  "Time, and Situation     Objective:      Precautions - Fall     Bed Mobility: Mod-A and Assist x 2, verbal cueing for utilization of bed rails and appropriate positioning  Functional Transfers: N/A or Not attempted.  Balance: supported and standing Mod-A and Assist x 2  Functional Ambulation: N/A or Not attempted. Arleth Stedy use for bed<>chair transfer.     Lower Body Dressing: Dependent  ADL Position: supine  ADL Comments: Don socks     Therapeutic Exercise - 10 Reps B UE AROM supported sitting / chair     Vitals: Hypertensive, 156/102, 139/91     Pain: 0 VAS  Location: N/A  Interventions for pain: N/A  Education: Provided education on the importance of mobility in the acute care setting, Verbal/Tactile Cues, ADL training, and Transfer Training     Assessment: Poncho Dalton Sr. presents with ADL impairments affecting function including balance, coordination, endurance / activity tolerance, motor planning, range of motion (ROM), and strength. Pt A&O x4, no reports of pain. Pt completed bed mobility with mod A x2 with verbal cueing for appropriate positioning. Pt sat EOB with SBA and comes to standing with use of Arleth Stedy and mod A x2. Pt transferred to chair with Arleth Stedy and CGA for safety. In chair, pt completed BUE exercises, noted with limited BUE ROM. Demonstrated functioning below baseline abilities indicate the need for continued skilled intervention while inpatient. Tolerating session today without incident. Will continue to follow and progress as tolerated.      Plan/Recommendations:   Moderate Intensity Therapy recommended post-acute care. This is recommended as therapy feels the patient would require 3-4 days per week and wouldn't tolerate \"3 hour daily\" rehab intensity. SNF would be the preferred choice. If the patient does not agree to SNF, arrange HH or OP depending on home bound status. If patient is medically complex, consider LTACH.     Pt desires Skilled Rehab placement at discharge. Pt " cooperative; agreeable to therapeutic recommendations and plan of care.      Modified [place]: N/A = No pre-op stroke/TIA     Post-Tx Position: Up in Chair, Alarms activated, and Call light and personal items within reach  PPE: gloves and surgical mask     Therapy Charges for Today         Code Description Service Date Service Provider Modifiers Qty     91920394897 HC OT SELF CARE/MGMT/TRAIN EA 15 MIN 1/16/2025 Francis De Guzman, OT GO 1     [id number]  OT THERAPEUTIC ACT EA 15 MIN 1/16/2025 Francis De Guzman OT GO 1     [id number]  OT THER PROC EA 15 MIN 1/16/2025 Francis De Guzman OT GO 1               Time Calculation- OT         Row Name 01/16/25 1148                       Time Calculation- OT     OT Start Time 1022  -SP         OT Stop Time 1101  -SP         OT Time Calculation (min) 39 min  -SP         Total Timed Code Minutes- OT 39 minute(s)  -SP                      User Key  (r) = Recorded By, (t) = Taken By, (c) = Cosigned By        Initials Name Provider Type     SP Francis De Guzman, OT Occupational Therapist

## 2025-01-17 NOTE — DISCHARGE SUMMARY
"             [facility]  Discharge Summary    Date of Service: 2025  Patient Name: Poncho Dalton Sr.  : 1950  MRN: [id number]    Date of Admission: 2025  Discharge Diagnosis: COPD with acute exacerbation  Date of Discharge: 2025  Primary Care Physician: Ronaldo Alvarez MD      Presenting Problem:   Acute respiratory distress [R06.03]  COPD exacerbation [J44.1]  Peripheral arterial disease [I73.9]  Chronic obstructive pulmonary disease with acute exacerbation [J44.1]  COPD with acute exacerbation [J44.1]    Active and Resolved Hospital Problems:  Active Hospital Problems    Diagnosis POA    **COPD with acute exacerbation [J44.1] Unknown    COPD exacerbation [J44.1] Yes    Acute on chronic respiratory failure with hypoxia [J96.21] Yes    Acute on chronic hypoxic respiratory failure [J96.21] Unknown    Hyperkalemia [E87.5] Unknown    Hyperlipidemia [E78.5] Yes    Chronic obstructive pulmonary disease [J44.9] Yes    Obstructive sleep apnea syndrome [G47.33] Yes    Impairment of balance [R26.89] Yes    Gastroesophageal reflux disease [K21.9] Yes    Essential hypertension [I10] Yes    Depressive disorder [F32.A] Yes    Benign prostatic hyperplasia [N40.0] Yes    Generalized anxiety disorder [F41.1] Yes      Resolved Hospital Problems   No resolved problems to display.         Hospital Course     HPI:    \"Poncho Dalton Sr. is a 74 y.o. male with a CMH of COPD, chonic 3L o2 at home who presented to [facility] on 2025 with increasing shortness of breath over the last couple days.  Patient is a poor historian which may be related to his medical  understanding of question.  States that he has been having weak legs since around 9 PM day of arrival which made him concerned that he wanted to come into the hospital for further evaluation.  While EMS was en route patient was  noted to need 5 L nasal cannula to maintain oxygenation.  Patient given DuoNebs and " "Solu-Medrol en route.  -Afterward he admits that he has been having shortness of breath for the past 2 to 3 days in duration with associated symptoms of chills, dizziness, coughing.  Admits to constipation for the past 3 days in duration without a bowel movement.  Denies any sick contacts  -Vital signs in the ER significant for tachycardia, tachypnea, mild elevated blood pressure, patient tolerating nasal cannula.  Vital signs in the ER showed mild hyperkalemia, STEPHEN, mild interstitial edema noted on chest x-ray.  Patient was treated with DuoNeb and albuterol neb while in the ER.  Oxygen weaned down from 5 L to 3 L.\"    Hospital Course:  Patient was originally admitted due to COPD exacerbation.chest x-ray showed bilateral perihilar interstitial thickening may be on the basis of interstitial edema.  Patient had a TTE which resulted with a normal LVEF.  Patient was started on scheduled DuoNebs as well as oral steroids and IV antibiotics.  PT OT worked with patient and recommended skilled nursing facility upon discharge.  ABIs were performed showing lower extremity bilaterally moderately reduced.  Vascular surgery was consulted, per vascular surgery would not recommend intervention while being treated for COPD exacerbation.  Vascular surgery recommendation was to keep lower extremities, continue wound care and arrange outpatient follow-up.    Labs were monitored, electrolytes replaced.  Patient did have STEPHEN on arrival, creatinine was monitored and renal ultrasound was performed.  Nephrology was consulted during hospital stay.  Patient creatinine at this time 1.03.    Patient is medically stable for discharge to skilled nursing facility with close outpatient follow-up and monitoring.    DISCHARGE Follow Up Recommendations for labs and diagnostics: PCP 2-3 days        Day of Discharge     Vital Signs:  Temp:  [97.5 °F (36.4 °C)-98.2 °F (36.8 °C)] 97.5 °F (36.4 °C)  Heart Rate:  [] 96  Resp:  [16-20] 19  BP: " (118-164)/(70-96) 156/77  Flow (L/min) (Oxygen Therapy):  [3] 3    Physical Exam:  Physical Exam   General: 73 yo male, Alert and oriented, obese, no acute distress.  HENT: Normocephalic, normal hearing, moist oral mucosa, no scleral icterus.  Neck: Supple, nontender, no carotid bruits, no JVD, no LAD.  Lungs: mild wheezing, nonlabored respiration.  Heart: RRR, no murmur, gallop or edema.  Abdomen: Soft, nontender, nondistended, + bowel sounds.  Musculoskeletal: Normal range of motion and strength, no tenderness or swelling.  Skin: Skin is warm, dry and pink, no rashes or lesions.  Psychiatric: Cooperative, appropriate mood and affect.        Pertinent  and/or Most Recent Results     LAB RESULTS:      Lab 01/17/25  0309 01/16/25  0451 01/15/25  0033 01/14/25  0741 01/13/25 2139 01/13/25 2132   WBC 10.32 10.91* 11.40* 6.47  --  9.94   HEMOGLOBIN 13.1 12.6* 12.4* 13.1  --  13.9   HEMATOCRIT 40.4 39.3 37.1* 40.1  --  42.6   PLATELETS 347 317 372 388  --  402   NEUTROS ABS  --  7.15* 8.80* 5.60  --  8.40*   IMMATURE GRANS (ABS)  --  0.06* 0.10* 0.03  --  0.07*   LYMPHS ABS  --  2.83 1.52 0.79  --  1.15   MONOS ABS  --  0.86 0.97* 0.04*  --  0.28   EOS ABS  --  0.00 0.00 0.00  --  0.01   MCV 92.0 91.6 90.0 88.7  --  92.0   LACTATE  --   --   --   --  1.3  --          Lab 01/17/25  0531 01/16/25  0319 01/15/25  0033 01/14/25  0741 01/13/25 2132   SODIUM 135* 136 138 135* 136   POTASSIUM 4.2 4.8 4.3 5.1 5.5*   CHLORIDE 97* 100 99 99 98   CO2 26.4 25.9 26.2 23.8 26.9   ANION GAP 11.6 10.1 12.8 12.2 11.1   BUN 18 21 35* 29* 29*   CREATININE 1.03 1.11 1.53* 1.60* 1.56*   EGFR 76.7 70.1 47.7* 45.2* 46.6*   GLUCOSE 105* 110* 148* 123* 82   CALCIUM 9.3 9.0 9.1 9.2 9.6   PHOSPHORUS 2.4* 3.0 3.6  --   --          Lab 01/17/25  0531 01/16/25  0319 01/15/25  0033 01/13/25  2132   TOTAL PROTEIN  --   --   --  8.0   ALBUMIN 4.2 3.7 3.8 4.0   GLOBULIN  --   --   --  4.0   ALT (SGPT)  --   --   --  15   AST (SGOT)  --   --   --  32    BILIRUBIN  --   --   --  0.3   ALK PHOS  --   --   --  110         Lab 01/14/25  1510 01/13/25  2238 01/13/25  2132   PROBNP 363.0  --  143.0   HSTROP T  --  27* 28*                 Lab 01/13/25  2141   PH, ARTERIAL 7.473*   PCO2, ARTERIAL 36.1   PO2 ART 96.5   O2 SATURATION ART 98.0   FIO2 40   HCO3 ART 26.5   BASE EXCESS ART 3.0     Brief Urine Lab Results  (Last result in the past 365 days)        Color   Clarity   Blood   Leuk Est   Nitrite   Protein   CREAT   Urine HCG        01/15/25 2306 Yellow   Clear   Negative   Negative   Negative   Negative                 Microbiology Results (last 10 days)       Procedure Component Value - Date/Time    Respiratory Panel PCR w/COVID-19(SARS-CoV-2) DILEEP/RORO/YANELI/PAD/COR/JONG In-House, NP Swab in UTM/VTM, 2 HR TAT - Swab, Nasopharynx [[id number]]  (Normal) Collected: 01/13/25 2338    Lab Status: Final result Specimen: Swab from Nasopharynx Updated: 01/14/25 0029     ADENOVIRUS, PCR Not Detected     Coronavirus 229E Not Detected     Coronavirus HKU1 Not Detected     Coronavirus NL63 Not Detected     Coronavirus OC43 Not Detected     COVID19 Not Detected     Human Metapneumovirus Not Detected     Human Rhinovirus/Enterovirus Not Detected     Influenza A PCR Not Detected     Influenza B PCR Not Detected     Parainfluenza Virus 1 Not Detected     Parainfluenza Virus 2 Not Detected     Parainfluenza Virus 3 Not Detected     Parainfluenza Virus 4 Not Detected     RSV, PCR Not Detected     Bordetella pertussis pcr Not Detected     Bordetella parapertussis PCR Not Detected     Chlamydophila pneumoniae PCR Not Detected     Mycoplasma pneumo by PCR Not Detected    Narrative:      In the setting of a positive respiratory panel with a viral infection PLUS a negative procalcitonin without other underlying concern for bacterial infection, consider observing off antibiotics or discontinuation of antibiotics and continue supportive care. If the respiratory panel is positive for atypical  bacterial infection (Bordetella pertussis, Chlamydophila pneumoniae, or Mycoplasma pneumoniae), consider antibiotic de-escalation to target atypical bacterial infection.    Blood Culture - Blood, Hand, Left [[id number]]  (Normal) Collected: 01/13/25 2200    Lab Status: Preliminary result Specimen: Blood from Hand, Left Updated: 01/16/25 2215     Blood Culture No growth at 3 days    Narrative:      Less than seven (7) mL's of blood was collected.  Insufficient quantity may yield false negative results.    Blood Culture - Blood, Arm, Right [[id number]]  (Normal) Collected: 01/13/25 2132    Lab Status: Preliminary result Specimen: Blood from Arm, Right Updated: 01/16/25 2146     Blood Culture No growth at 3 days            US Renal Bilateral    Result Date: 1/14/2025  Impression: Impression: Symmetric mild renal atrophy and renal cortical thinning, otherwise no acute finding. Electronically Signed: Quan Escalante MD  1/14/2025 2:49 PM EST  Workstation ID: [id number]    XR Chest 1 View    Result Date: 1/13/2025  Impression: Impression: Low lung volumes. Bilateral perihilar interstitial thickening may be on the basis of interstitial edema. Electronically Signed: Dick Forman MD  1/13/2025 10:11 PM EST  Workstation ID: [id number]     Results for orders placed during the hospital encounter of 01/13/25    Doppler Arterial Multi Level Lower Extremity - Bilateral CAR    Interpretation Summary    Right Conclusion: The right VARGHESE is moderately reduced. Waveforms are consistent with aorto-iliac disease, femoral disease and popliteal disease. Moderate digital insufficiency.    Left Conclusion: The left VARGHESE is mildly reduced. Waveforms are consistent with aorto-iliac disease. Mild digital insufficiency.      Results for orders placed during the hospital encounter of 01/13/25    Doppler Arterial Multi Level Lower Extremity - Bilateral CAR    Interpretation Summary    Right Conclusion: The right VARGHESE is moderately reduced. Waveforms are  consistent with aorto-iliac disease, femoral disease and popliteal disease. Moderate digital insufficiency.    Left Conclusion: The left VARGHESE is mildly reduced. Waveforms are consistent with aorto-iliac disease. Mild digital insufficiency.      Results for orders placed during the hospital encounter of 01/13/25    Adult Transthoracic Echo Complete W/ Cont if Necessary Per Protocol    Interpretation Summary    Left ventricular systolic function is normal. Calculated left ventricular EF = 60% Left ventricular ejection fraction appears to be 56 - 60%.    Left ventricular diastolic function is consistent with (grade Ia w/high LAP) impaired relaxation.    The left atrial cavity is dilated.    Estimated right ventricular systolic pressure from tricuspid regurgitation is normal (<35 mmHg).    Prominent layer of pericardial fat pad noted.  Unchanged when compared to previous echocardiogram from 2022.      Labs Pending at Discharge:  Pending Results       Procedure [Order ID] Specimen - Date/Time    Extra Tubes [[id number]] Collected: 01/13/25 2205    Specimen: Blood, Venous Line Updated: 01/13/25 2215    Narrative:      The following orders were created for panel order Extra Tubes.  Procedure                               Abnormality         Status                     ---------                               -----------         ------                     Gold Top - [facility][[id number]]                                   Final result               Light Blue Top[[id number]]                                                                Please view results for these tests on the individual orders.    Light Blue Top [[id number]]     Specimen: Blood             Procedures Performed           Consults:   Consults       Date and Time Order Name Status Description    1/14/2025  1:43 PM Inpatient Vascular Surgery Consult Completed     1/14/2025  1:40 PM Inpatient Nephrology Consult Completed     1/13/2025 11:26 PM Hospitalist (on-call MD  unless specified)      1/13/2025 11:03 PM Hospitalist (on-call MD unless specified)                Discharge Details        Discharge Medications        New Medications        Instructions Start Date   predniSONE 20 MG tablet  Commonly known as: DELTASONE   40 mg, Oral, Daily With Breakfast   Start Date: January 18, 2025            Changes to Medications        Instructions Start Date   baclofen 20 MG tablet  Commonly known as: LIORESAL  What changed:   when to take this  reasons to take this   20 mg, Oral, 2 Times Daily PRN      citalopram 40 MG tablet  Commonly known as: CeleXA  What changed: how much to take   20 mg, Oral, Daily             Continue These Medications        Instructions Start Date   albuterol sulfate  (90 Base) MCG/ACT inhaler  Commonly known as: PROVENTIL HFA;VENTOLIN HFA;PROAIR HFA   No dose, route, or frequency recorded.      allopurinol 100 MG tablet  Commonly known as: ZYLOPRIM   100 mg      aspirin 81 MG EC tablet   81 mg, Oral, Daily      cholecalciferol 25 MCG (1000 UT) tablet  Commonly known as: VITAMIN D3   1,000 Units, Daily      DULoxetine 60 MG capsule  Commonly known as: CYMBALTA   60 mg, Oral, Daily      gabapentin 800 MG tablet  Commonly known as: NEURONTIN   800 mg, Oral, 3 Times Daily      HYDROcodone-acetaminophen  MG per tablet  Commonly known as: NORCO   1 tablet, Oral, Every 6 Hours PRN      isosorbide mononitrate 30 MG 24 hr tablet  Commonly known as: IMDUR   TAKE ONE TABLET BY MOUTH EVERY MORNING      meloxicam 15 MG tablet  Commonly known as: MOBIC   15 mg, Oral, Daily      nitroglycerin 0.4 MG SL tablet  Commonly known as: NITROSTAT   No dose, route, or frequency recorded.      omeprazole 20 MG capsule  Commonly known as: priLOSEC   20 mg, Oral, Daily      Oyster Shell Calcium/D 250-125 MG-UNIT tablet   1 tablet, Daily      pravastatin 40 MG tablet  Commonly known as: PRAVACHOL   40 mg, Oral, Daily      tamsulosin 0.4 MG capsule 24 hr capsule  Commonly known  as: FLOMAX   1 capsule, Oral, Daily             Stop These Medications      meclizine 25 MG tablet  Commonly known as: ANTIVERT     sulfamethoxazole-trimethoprim 800-160 MG per tablet  Commonly known as: BACTRIM DS,SEPTRA DS              No Known Allergies      Discharge Disposition:   Skilled Nursing Facility (DC - External)    Diet:  Hospital:  Diet Order   Procedures    Diet: Cardiac; Healthy Heart (2-3 Na+); Fluid Consistency: Thin (IDDSI 0)         Discharge Activity:         CODE STATUS:  Code Status and Medical Interventions: CPR (Attempt to Resuscitate); Full Support   Ordered at: 01/14/25 0000     Level Of Support Discussed With:    Patient     Code Status (Patient has no pulse and is not breathing):    CPR (Attempt to Resuscitate)     Medical Interventions (Patient has pulse or is breathing):    Full Support         Future Appointments   Date Time Provider Department Center   1/20/2025 10:15 AM [facility] WOUND CARE ROOM 3 [facility] None   2/20/2025  1:45 PM Li Blackmon MD [facility]       Additional Instructions for the Follow-ups that You Need to Schedule       Discharge Follow-up with PCP   As directed       Currently Documented PCP:    Ronaldo Alvarez MD    PCP Phone Number:    [phone]     Follow Up Details: 2 to 3 days        Discharge Follow-up with Specified Provider: Vascular surgery; 1 Month   As directed      To: Vascular surgery   Follow Up: 1 Month                Time spent on Discharge including face to face service:  45 minutes    Signature: Electronically signed by KYLEIGH Ocampo, 01/17/25, 12:51 EST.  [facility]ist [facility]

## 2025-01-17 NOTE — PROGRESS NOTES
[facility] Progress Note      Patient Name: Poncho Dalton Sr.  : 1950  MRN: [id number]  Primary Care Physician:  Ronaldo Alvarez MD  Date of admission: 2025    Subjective     Interval History:     Patient resting comfortably.    Denies any chest pain, nausea vomiting  Dyspnea improving    Review of Systems:   As noted above    Objective     Vitals:   Temp:  [97.5 °F (36.4 °C)-98.2 °F (36.8 °C)] 97.5 °F (36.4 °C)  Heart Rate:  [] 96  Resp:  [16-20] 19  BP: (118-164)/(70-96) 156/77  Flow (L/min) (Oxygen Therapy):  [3] 3    Intake/Output Summary (Last 24 hours) at 2025 0923  Last data filed at 2025 0032  Gross per 24 hour   Intake 1820 ml   Output 350 ml   Net 1470 ml       Physical Exam:      General Appearance: Chronically ill-appearing, NAD  Skin: warm and dry  HEENT: oral mucosa normal, nonicteric sclera  Neck: supple, no JVD  Lungs: Decreased breath sound bases.  Few scattered wheezes bilaterally  Heart: RRR, normal S1 and S2  Abdomen: soft, nontender, nondistended  : no palpable bladder  Extremities: Trace bilateral lower extremities edema with venous stasis changes  Neuro: normal speech and mental status     Scheduled Meds:     allopurinol, 100 mg, Oral, Daily  ampicillin-sulbactam, 3,000 mg, Intravenous, Q6H  aspirin, 81 mg, Oral, Daily  atorvastatin, 10 mg, Oral, Daily  citalopram, 20 mg, Oral, Daily  enoxaparin, 40 mg, Subcutaneous, Daily  gabapentin, 800 mg, Oral, Q8H  isosorbide mononitrate, 30 mg, Oral, Daily  mineral oil-hydrophilic petrolatum, 1 Application, Topical, Daily  pantoprazole, 40 mg, Oral, Q AM  predniSONE, 40 mg, Oral, Daily With Breakfast  sodium chloride, 10 mL, Intravenous, Q12H  tamsulosin, 0.4 mg, Oral, Daily      IV Meds:          Results Reviewed:   I have personally reviewed the results from the time of this admission to 2025 09:23 EST     Results from last 7 days   Lab Units 25  0531 25  0319  01/15/25  0033 01/14/25  0741 01/13/25  2132   SODIUM mmol/L 135* 136 138   < > 136   POTASSIUM mmol/L 4.2 4.8 4.3   < > 5.5*   CHLORIDE mmol/L 97* 100 99   < > 98   CO2 mmol/L 26.4 25.9 26.2   < > 26.9   BUN mg/dL 18 21 35*   < > 29*   CREATININE mg/dL 1.03 1.11 1.53*   < > 1.56*   CALCIUM mg/dL 9.3 9.0 9.1   < > 9.6   BILIRUBIN mg/dL  --   --   --   --  0.3   ALK PHOS U/L  --   --   --   --  110   ALT (SGPT) U/L  --   --   --   --  15   AST (SGOT) U/L  --   --   --   --  32   GLUCOSE mg/dL 105* 110* 148*   < > 82    < > = values in this interval not displayed.     Estimated Creatinine Clearance: 70.6 mL/min (by C-G formula based on SCr of 1.03 mg/dL).  Results from last 7 days   Lab Units 01/17/25  0531 01/16/25  0319 01/15/25  0033   PHOSPHORUS mg/dL 2.4* 3.0 3.6         Results from last 7 days   Lab Units 01/17/25  0309 01/16/25  0451 01/15/25  0033 01/14/25  0741 01/13/25  2132   WBC 10*3/mm3 10.32 10.91* 11.40* 6.47 9.94   HEMOGLOBIN g/dL 13.1 12.6* 12.4* 13.1 13.9   PLATELETS 10*3/mm3 347 317 372 388 402           Assessment / Plan     ASSESSMENT:    Acute kidney injury.  Seems to be prerenal in etiology related to COPD exacerbation.  Creatinine decreased to 1.03 Mg/DL.  Electrolytes okay   hyperkalemia.  Mild.  Improved  COPD exacerbation.  Acute on chronic hypoxic respiratory failure.  Patient is on antibiotics and steroids  History of BPH.  On tamsulosin.  No significant obstructive symptoms  Benign essential hypertension.  Pressure well-controlled    PLAN:  If you to restart low-dose Lasix from renal standpoint  I will sign off.  Please call with any questions  Okay to discharge from renal standpoint    Juan Balderas MD  01/17/25  09:23 EST    [facility]  [phone]

## 2025-01-18 LAB
BACTERIA SPEC AEROBE CULT: NORMAL
BACTERIA SPEC AEROBE CULT: NORMAL

## 2025-01-18 NOTE — PROGRESS NOTES
Enter Query Response Below      Query Response: Opioid Dependence              If applicable, please update the problem list.   Patient: Poncho Dalton Sr.        : 1950  Account: [id number]           Admit Date:         How to Respond to this query:       a. Click New Note     b. Answer query within the yellow box.                c. Update the Problem List, if applicable.      If you have any questions about this query contact me at: [email or web address]      Dr. Pineda,    Patient admitted with COPD exacerbation and noted to have chronic pain.  Patients home dose of Baclofen, Norco, and Gabapentin were continued on admission.    Please clarify the following:    Opioid Use  Opioid Dependence  Other- specify______      By submitting this query, we are merely seeking further clarification of documentation to accurately reflect all conditions that you are monitoring, evaluating, treating or that extend the hospitalization or utilize additional resources of care. Please utilize your independent clinical judgment when addressing the question(s) above.     This query and your response, once completed, will be entered into the legal medical record.    Sincerely,  Viky Waters, BS, BSN, RN, CCDS   Clinical Documentation Integrity Program

## 2025-01-27 ENCOUNTER — OFFICE VISIT (OUTPATIENT)
Dept: WOUND CARE | Facility: HOSPITAL | Age: 75
End: 2025-01-27
Payer: MEDICARE

## 2025-01-27 PROCEDURE — G0463 HOSPITAL OUTPT CLINIC VISIT: HCPCS

## 2025-02-03 ENCOUNTER — OFFICE VISIT (OUTPATIENT)
Dept: WOUND CARE | Facility: HOSPITAL | Age: 75
End: 2025-02-03
Payer: MEDICARE

## 2025-02-24 ENCOUNTER — OFFICE VISIT (OUTPATIENT)
Dept: WOUND CARE | Facility: HOSPITAL | Age: 75
End: 2025-02-24
Payer: MEDICARE

## 2025-02-24 PROCEDURE — G0463 HOSPITAL OUTPT CLINIC VISIT: HCPCS

## 2025-02-28 ENCOUNTER — TRANSCRIBE ORDERS (OUTPATIENT)
Dept: ADMINISTRATIVE | Facility: HOSPITAL | Age: 75
End: 2025-02-28
Payer: MEDICARE

## 2025-02-28 DIAGNOSIS — I70.234 ATHEROSCLEROSIS OF NATIVE ARTERY OF RIGHT LOWER EXTREMITY WITH ULCERATION OF HEEL: Primary | ICD-10-CM

## 2025-03-10 ENCOUNTER — OFFICE VISIT (OUTPATIENT)
Dept: WOUND CARE | Facility: HOSPITAL | Age: 75
End: 2025-03-10
Payer: MEDICARE

## 2025-03-10 PROCEDURE — G0463 HOSPITAL OUTPT CLINIC VISIT: HCPCS

## 2025-03-31 ENCOUNTER — OFFICE VISIT (OUTPATIENT)
Dept: WOUND CARE | Facility: HOSPITAL | Age: 75
End: 2025-03-31
Payer: MEDICARE

## 2025-03-31 PROCEDURE — G0463 HOSPITAL OUTPT CLINIC VISIT: HCPCS

## 2025-04-14 ENCOUNTER — OFFICE VISIT (OUTPATIENT)
Dept: WOUND CARE | Facility: HOSPITAL | Age: 75
End: 2025-04-14
Payer: MEDICARE

## 2025-04-14 PROCEDURE — G0463 HOSPITAL OUTPT CLINIC VISIT: HCPCS

## 2025-04-28 ENCOUNTER — OFFICE VISIT (OUTPATIENT)
Dept: WOUND CARE | Facility: HOSPITAL | Age: 75
End: 2025-04-28
Payer: MEDICARE

## 2025-04-28 DIAGNOSIS — I87.2 VENOUS INSUFFICIENCY (CHRONIC) (PERIPHERAL): ICD-10-CM

## 2025-04-28 DIAGNOSIS — L97.512 NON-PRESSURE CHRONIC ULCER OF OTHER PART OF RIGHT FOOT WITH FAT LAYER EXPOSED: Primary | ICD-10-CM

## 2025-04-28 DIAGNOSIS — Z87.891 PERSONAL HISTORY OF NICOTINE DEPENDENCE: ICD-10-CM

## 2025-04-28 DIAGNOSIS — I70.234 ATHEROSCLEROSIS OF NATIVE ARTERIES OF RIGHT LEG WITH ULCERATION OF HEEL AND MIDFOOT: ICD-10-CM

## 2025-04-28 PROCEDURE — G0463 HOSPITAL OUTPT CLINIC VISIT: HCPCS

## 2025-06-30 ENCOUNTER — OFFICE VISIT (OUTPATIENT)
Dept: WOUND CARE | Facility: HOSPITAL | Age: 75
End: 2025-06-30
Payer: MEDICARE

## 2025-06-30 PROCEDURE — 97602 WOUND(S) CARE NON-SELECTIVE: CPT

## 2025-07-31 ENCOUNTER — OFFICE VISIT (OUTPATIENT)
Age: 75
End: 2025-07-31
Payer: MEDICARE

## 2025-07-31 VITALS
SYSTOLIC BLOOD PRESSURE: 116 MMHG | HEART RATE: 73 BPM | OXYGEN SATURATION: 92 % | DIASTOLIC BLOOD PRESSURE: 64 MMHG | WEIGHT: 220 LBS | BODY MASS INDEX: 35.36 KG/M2 | HEIGHT: 66 IN | TEMPERATURE: 98.4 F | RESPIRATION RATE: 18 BRPM

## 2025-07-31 DIAGNOSIS — I70.25 ATHEROSCLEROSIS OF NATIVE ARTERIES OF THE EXTREMITIES WITH ULCERATION: Primary | ICD-10-CM

## 2025-07-31 PROCEDURE — 1160F RVW MEDS BY RX/DR IN RCRD: CPT | Performed by: STUDENT IN AN ORGANIZED HEALTH CARE EDUCATION/TRAINING PROGRAM

## 2025-07-31 PROCEDURE — 99214 OFFICE O/P EST MOD 30 MIN: CPT | Performed by: STUDENT IN AN ORGANIZED HEALTH CARE EDUCATION/TRAINING PROGRAM

## 2025-07-31 PROCEDURE — 3074F SYST BP LT 130 MM HG: CPT | Performed by: STUDENT IN AN ORGANIZED HEALTH CARE EDUCATION/TRAINING PROGRAM

## 2025-07-31 PROCEDURE — 3078F DIAST BP <80 MM HG: CPT | Performed by: STUDENT IN AN ORGANIZED HEALTH CARE EDUCATION/TRAINING PROGRAM

## 2025-07-31 PROCEDURE — 1159F MED LIST DOCD IN RCRD: CPT | Performed by: STUDENT IN AN ORGANIZED HEALTH CARE EDUCATION/TRAINING PROGRAM

## 2025-08-11 ENCOUNTER — OFFICE VISIT (OUTPATIENT)
Dept: WOUND CARE | Facility: HOSPITAL | Age: 75
End: 2025-08-11
Payer: MEDICARE

## 2025-08-11 PROCEDURE — G0463 HOSPITAL OUTPT CLINIC VISIT: HCPCS

## 2025-08-27 ENCOUNTER — HOSPITAL ENCOUNTER (OUTPATIENT)
Dept: CARDIOLOGY | Facility: HOSPITAL | Age: 75
Discharge: HOME | End: 2025-08-27
Payer: MEDICARE

## 2025-08-27 ENCOUNTER — HOSPITAL ENCOUNTER (OUTPATIENT)
Dept: CT IMAGING | Facility: HOSPITAL | Age: 75
Discharge: HOME | End: 2025-08-27
Payer: MEDICARE

## 2025-08-27 DIAGNOSIS — I70.25 ATHEROSCLEROSIS OF NATIVE ARTERIES OF THE EXTREMITIES WITH ULCERATION: ICD-10-CM

## 2025-08-27 PROCEDURE — 93923 UPR/LXTR ART STDY 3+ LVLS: CPT

## 2025-08-27 PROCEDURE — 25510000001 IOPAMIDOL PER 1 ML: Performed by: STUDENT IN AN ORGANIZED HEALTH CARE EDUCATION/TRAINING PROGRAM

## 2025-08-27 PROCEDURE — 75635 CT ANGIO ABDOMINAL ARTERIES: CPT

## 2025-08-27 RX ORDER — IOPAMIDOL 755 MG/ML
100 INJECTION, SOLUTION INTRAVASCULAR
Status: COMPLETED | OUTPATIENT
Start: 2025-08-27 | End: 2025-08-27

## 2025-08-27 RX ADMIN — IOPAMIDOL 100 ML: 755 INJECTION, SOLUTION INTRAVENOUS at 09:28

## 2025-08-29 LAB
BH CV LOWER ARTERIAL LEFT ABI RATIO: 0.84
BH CV LOWER ARTERIAL LEFT CALF RATIO: 0.81
BH CV LOWER ARTERIAL LEFT DORSALIS PEDIS SYS MAX: 111
BH CV LOWER ARTERIAL LEFT GREAT TOE SYS MAX: 114
BH CV LOWER ARTERIAL LEFT LOW THIGH RATIO: 1.11
BH CV LOWER ARTERIAL LEFT LOW THIGH SYS MAX: 146
BH CV LOWER ARTERIAL LEFT POPLITEAL SYS MAX: 107
BH CV LOWER ARTERIAL LEFT POST TIBIAL SYS MAX: 106
BH CV LOWER ARTERIAL LEFT TBI RATIO: 0.86
BH CV LOWER ARTERIAL RIGHT ABI RATIO: 0.85
BH CV LOWER ARTERIAL RIGHT CALF RATIO: 0.86
BH CV LOWER ARTERIAL RIGHT DORSALIS PEDIS SYS MAX: 109
BH CV LOWER ARTERIAL RIGHT GREAT TOE SYS MAX: 96
BH CV LOWER ARTERIAL RIGHT LOW THIGH RATIO: 0.92
BH CV LOWER ARTERIAL RIGHT LOW THIGH SYS MAX: 121
BH CV LOWER ARTERIAL RIGHT POPLITEAL SYS MAX: 114
BH CV LOWER ARTERIAL RIGHT POST TIBIAL SYS MAX: 112
BH CV LOWER ARTERIAL RIGHT TBI RATIO: 0.73
UPPER ARTERIAL LEFT ARM BRACHIAL SYS MAX: 123
UPPER ARTERIAL RIGHT ARM BRACHIAL SYS MAX: 132